# Patient Record
Sex: FEMALE | Race: WHITE | NOT HISPANIC OR LATINO | Employment: FULL TIME | URBAN - METROPOLITAN AREA
[De-identification: names, ages, dates, MRNs, and addresses within clinical notes are randomized per-mention and may not be internally consistent; named-entity substitution may affect disease eponyms.]

---

## 2017-01-18 DIAGNOSIS — F41.9 ANXIETY: Primary | ICD-10-CM

## 2017-01-18 RX ORDER — SERTRALINE HYDROCHLORIDE 100 MG/1
TABLET, FILM COATED ORAL
Qty: 30 TABLET | Refills: 2 | Status: SHIPPED | OUTPATIENT
Start: 2017-01-18 | End: 2017-05-14

## 2017-01-18 NOTE — TELEPHONE ENCOUNTER
sertraline (ZOLOFT) 100 MG tablet     Last Written Prescription Date: 09/06/16  Last Fill Quantity: 30, # refills: 3  Last Office Visit with [facility] primary care provider:  09/06/16        Last PHQ-9 score on record=   PHQ-9 SCORE 9/6/2016   Total Score -   Total Score 10         [facility]

## 2017-01-18 NOTE — TELEPHONE ENCOUNTER
Routing refill request to provider for review/approval because:  PHQ-9 over 4  Tawanna Taveras RN

## 2017-02-09 ENCOUNTER — TELEPHONE (OUTPATIENT)
Dept: FAMILY MEDICINE | Facility: CLINIC | Age: 47
End: 2017-02-09

## 2017-02-09 NOTE — LETTER
February 21, 2017      Jacquie Goncalves  [address]  [place] [facility] [address]        Dear Jacquie,    This is a friendly reminder that you are due for depression follow up with a simple, quick depression questionnaire called a PHQ-9. Please contact our clinic to complete the questionnaire over the phone.    This questionnaire has two main purposes: 1)  It helps your healthcare provider to determine your response to the care we have provided you and helps us guide further management of your mental well being.  2)  Reassure your healthcare provider that your symptoms are stable, if you are no longer experiencing depression symptoms.    If you have any questions, concerns or have trouble completing this questionnaire, please reach out to us through [employer] or by calling our clinic at [phone].    Sincerely,    [facility]  Denae Rush MD, MPH /ymv

## 2017-02-21 NOTE — TELEPHONE ENCOUNTER
Letter created and will be sent to patient's home for patient to contact our clinic to complete questionnaire over the phone.  Jero Victor CMA

## 2017-05-14 DIAGNOSIS — F41.9 ANXIETY: ICD-10-CM

## 2017-05-14 NOTE — TELEPHONE ENCOUNTER
sertraline (ZOLOFT) 100 MG tablet     Last Written Prescription Date: 1/18/17  Last Fill Quantity: 30, # refills: 2  Last Office Visit with [facility] primary care provider:  9/6/16        Last PHQ-9 score on record=   PHQ-9 SCORE 9/6/2016   Total Score -   Total Score 10               Jalil Faarax  [facility]

## 2017-05-15 RX ORDER — SERTRALINE HYDROCHLORIDE 100 MG/1
TABLET, FILM COATED ORAL
Qty: 30 TABLET | Refills: 1 | Status: SHIPPED | OUTPATIENT
Start: 2017-05-15 | End: 2017-07-17

## 2017-05-31 DIAGNOSIS — K00.9 CONGENITAL ANOMALY OF TEETH: ICD-10-CM

## 2017-05-31 NOTE — TELEPHONE ENCOUNTER
PREVIDENT 5000 SENSITIVE 1.1-5 % PSTE      Last Written Prescription Date: 04/06/16  Last Fill Quantity: 100g,  # refills: 12   Last Office Visit with [facility] or [facility] prescribing provider: 09/06/16      [facility]

## 2017-06-05 NOTE — TELEPHONE ENCOUNTER
Routing refill request to provider for review/approval because:  Drug not on the [facility] refill protocol     Britney Manriquez RN, [facility]

## 2017-07-15 DIAGNOSIS — F41.9 ANXIETY: ICD-10-CM

## 2017-07-15 RX ORDER — SERTRALINE HYDROCHLORIDE 100 MG/1
TABLET, FILM COATED ORAL
Qty: 30 TABLET | Refills: 0 | Status: CANCELLED | OUTPATIENT
Start: 2017-07-15

## 2017-07-15 NOTE — TELEPHONE ENCOUNTER
sertraline (ZOLOFT) 100 MG tablet      Last Written Prescription Date: 5/15/17  Last Fill Quantity: 30,  # refills: 1   Last Office Visit with [facility], [facility] or [facility] prescribing provider: 12/15/16

## 2017-07-17 DIAGNOSIS — G47.00 INSOMNIA, UNSPECIFIED TYPE: ICD-10-CM

## 2017-07-17 DIAGNOSIS — F41.9 ANXIETY: Primary | ICD-10-CM

## 2017-07-17 RX ORDER — SERTRALINE HYDROCHLORIDE 100 MG/1
100 TABLET, FILM COATED ORAL DAILY
Qty: 90 TABLET | Refills: 0 | Status: SHIPPED | OUTPATIENT
Start: 2017-07-17 | End: 2017-10-11

## 2017-07-17 RX ORDER — TEMAZEPAM 15 MG/1
15 CAPSULE ORAL
Qty: 30 CAPSULE | Refills: 2 | Status: SHIPPED | OUTPATIENT
Start: 2017-07-17 | End: 2017-11-07

## 2017-10-11 DIAGNOSIS — F41.9 ANXIETY: ICD-10-CM

## 2017-10-12 NOTE — TELEPHONE ENCOUNTER
This writer attempted to contact Jacquie on 10/12/17      Reason for call needs appointment and update PHQ9,  and left detailed message.      If patient calls back:   Patient contacted by clinic RN team. Inform patient that someone from the team will contact them, document that pt called and route to care team. .        Tawanna Morin, RN

## 2017-10-12 NOTE — TELEPHONE ENCOUNTER
MA- please update PHQ9 and schedule for office visit and then flag RN to address refill.  Tawanna Morin RN.

## 2017-10-13 RX ORDER — SERTRALINE HYDROCHLORIDE 100 MG/1
TABLET, FILM COATED ORAL
Qty: 30 TABLET | Refills: 0 | Status: SHIPPED | OUTPATIENT
Start: 2017-10-13 | End: 2017-11-07

## 2017-10-13 ASSESSMENT — PATIENT HEALTH QUESTIONNAIRE - PHQ9: SUM OF ALL RESPONSES TO PHQ QUESTIONS 1-9: 3

## 2017-10-13 NOTE — TELEPHONE ENCOUNTER
Rx for anxiety. PHQ-9 not needed.   Patient due for OV.   Medication is being filled for 1 time refill only due to:  Patient needs to be seen because needs office visit for future refills.   Anne Loja RN

## 2017-10-13 NOTE — TELEPHONE ENCOUNTER
Called pt and scheduled her for an office visit. She did the PHQ-9 and she scored 3. Routing to RN to discuss the refill.Manny Robertson MA

## 2017-11-07 ENCOUNTER — OFFICE VISIT (OUTPATIENT)
Dept: FAMILY MEDICINE | Facility: CLINIC | Age: 47
End: 2017-11-07
Payer: COMMERCIAL

## 2017-11-07 VITALS
WEIGHT: 142.7 LBS | BODY MASS INDEX: 22.93 KG/M2 | OXYGEN SATURATION: 97 % | HEIGHT: 66 IN | RESPIRATION RATE: 16 BRPM | SYSTOLIC BLOOD PRESSURE: 116 MMHG | DIASTOLIC BLOOD PRESSURE: 68 MMHG | HEART RATE: 80 BPM | TEMPERATURE: 98.9 F

## 2017-11-07 DIAGNOSIS — G47.00 INSOMNIA, UNSPECIFIED TYPE: ICD-10-CM

## 2017-11-07 DIAGNOSIS — F41.9 ANXIETY: ICD-10-CM

## 2017-11-07 DIAGNOSIS — F32.1 MODERATE MAJOR DEPRESSION (H): Primary | ICD-10-CM

## 2017-11-07 PROCEDURE — 99214 OFFICE O/P EST MOD 30 MIN: CPT | Performed by: FAMILY MEDICINE

## 2017-11-07 RX ORDER — TEMAZEPAM 15 MG/1
15 CAPSULE ORAL
Qty: 90 CAPSULE | Refills: 1 | Status: SHIPPED | OUTPATIENT
Start: 2017-11-07 | End: 2018-05-08

## 2017-11-07 RX ORDER — SERTRALINE HYDROCHLORIDE 100 MG/1
200 TABLET, FILM COATED ORAL DAILY
Qty: 180 TABLET | Refills: 1 | Status: SHIPPED | OUTPATIENT
Start: 2017-11-07 | End: 2018-06-18

## 2017-11-07 ASSESSMENT — ANXIETY QUESTIONNAIRES
GAD7 TOTAL SCORE: 12
2. NOT BEING ABLE TO STOP OR CONTROL WORRYING: MORE THAN HALF THE DAYS
IF YOU CHECKED OFF ANY PROBLEMS ON THIS QUESTIONNAIRE, HOW DIFFICULT HAVE THESE PROBLEMS MADE IT FOR YOU TO DO YOUR WORK, TAKE CARE OF THINGS AT HOME, OR GET ALONG WITH OTHER PEOPLE: SOMEWHAT DIFFICULT
3. WORRYING TOO MUCH ABOUT DIFFERENT THINGS: MORE THAN HALF THE DAYS
5. BEING SO RESTLESS THAT IT IS HARD TO SIT STILL: SEVERAL DAYS
6. BECOMING EASILY ANNOYED OR IRRITABLE: SEVERAL DAYS
1. FEELING NERVOUS, ANXIOUS, OR ON EDGE: MORE THAN HALF THE DAYS
7. FEELING AFRAID AS IF SOMETHING AWFUL MIGHT HAPPEN: MORE THAN HALF THE DAYS

## 2017-11-07 ASSESSMENT — PATIENT HEALTH QUESTIONNAIRE - PHQ9
5. POOR APPETITE OR OVEREATING: MORE THAN HALF THE DAYS
SUM OF ALL RESPONSES TO PHQ QUESTIONS 1-9: 6

## 2017-11-07 ASSESSMENT — PAIN SCALES - GENERAL: PAINLEVEL: NO PAIN (0)

## 2017-11-07 NOTE — MR AVS SNAPSHOT
"              After Visit Summary   2017    Jacquie Goncalves    MRN: [id number]           Patient Information     Date Of Birth          1970        Visit Information        Provider Department      2017 1:40 PM Sylwia Cutler MD [facility]        Today's Diagnoses     Moderate major depression (H)    -  1    Anxiety        Insomnia, unspecified type           Follow-ups after your visit        Follow-up notes from your care team     Return in about 6 months (around 2018).      Who to contact     If you have questions or need follow up information about today's clinic visit or your schedule please contact [facility] directly at [phone].  Normal or non-critical lab and imaging results will be communicated to you by MyChart, letter or phone within 4 business days after the clinic has received the results. If you do not hear from us within 7 days, please contact the clinic through MyChart or phone. If you have a critical or abnormal lab result, we will notify you by phone as soon as possible.  Submit refill requests through [employer] or call your pharmacy and they will forward the refill request to us. Please allow 3 business days for your refill to be completed.          Additional Information About Your Visit        MyChart Information     [employer] lets you send messages to your doctor, view your test results, renew your prescriptions, schedule appointments and more. To sign up, go to [email or web address] . Click on \"Log in\" on the left side of the screen, which will take you to the Welcome page. Then click on \"Sign up Now\" on the right side of the page.     You will be asked to enter the access code listed below, as well as some personal information. Please follow the directions to create your username and password.     Your access code is: [email or web address]  Expires: 2018  2:55 PM     Your access code will  in 90 days. If you need help or a new " "code, please call your [place] clinic or [phone].        Care EveryWhere ID     This is your Care EveryWhere ID. This could be used by other organizations to access your [place] medical records  [id number]        Your Vitals Were     Pulse Temperature Respirations Height Pulse Oximetry BMI (Body Mass Index)    80 98.9  F (37.2  C) (Oral) 16 1.683 m (5' 6.25\") 97% 22.86 kg/m2       Blood Pressure from Last 3 Encounters:   11/07/17 116/68   12/15/16 133/73   09/06/16 121/72    Weight from Last 3 Encounters:   11/07/17 64.7 kg (142 lb 11.2 oz)   12/15/16 71.7 kg (158 lb)   09/06/16 68.9 kg (152 lb)              Today, you had the following     No orders found for display         Today's Medication Changes          These changes are accurate as of: 11/7/17  2:55 PM.  If you have any questions, ask your nurse or doctor.               These medicines have changed or have updated prescriptions.        Dose/Directions    sertraline 100 MG tablet   Commonly known as:  ZOLOFT   This may have changed:  See the new instructions.   Used for:  Anxiety, Moderate major depression (H)   Changed by:  Sylwia Cutler MD        Dose:  200 mg   Take 2 tablets (200 mg) by mouth daily   Quantity:  180 tablet   Refills:  1            Where to get your medicines      These medications were sent to [id number] [place], [place] - [address]  [address] [address]     Phone:  [phone]     sertraline 100 MG tablet         Some of these will need a paper prescription and others can be bought over the counter.  Ask your nurse if you have questions.     Bring a paper prescription for each of these medications     temazepam 15 MG capsule                Primary Care Provider Office Phone # Fax #    Denae Rush [phone]       [address]  [facility] [address]        Equal Access to Services     GRICEL JERONIMO AH: Hadii germaine Shields, " suri castellaons jlabel chen. So [facility] [phone].    ATENCIÓN: Si karla watson, tiene a correia disposición servicios gratuitos de asistencia lingüística. Sussy al [phone].    We comply with applicable federal civil rights laws and [place] laws. We do not discriminate on the basis of race, color, national origin, age, disability, sex, sexual orientation, or gender identity.            Thank you!     Thank you for choosing [facility]  for your care. Our goal is always to provide you with excellent care. Hearing back from our patients is one way we can continue to improve our services. Please take a few minutes to complete the written survey that you may receive in the mail after your visit with us. Thank you!             Your Updated Medication List - Protect others around you: Learn how to safely use, store and throw away your medicines at www.disposemymeds.org.          This list is accurate as of: 11/7/17  2:55 PM.  Always use your most recent med list.                   Brand Name Dispense Instructions for use Diagnosis    B COMPLEX 1 PO      Take  by mouth.        esomeprazole 40 MG CR capsule    nexIUM    30 capsule    Take 1 capsule (40 mg) by mouth every morning (before breakfast) Take 30-60 minutes before a eating.    Gastroesophageal reflux disease, esophagitis presence not specified       methylphenidate 20 MG CR tablet    METADATE ER    60 tablet    Take 1 tablet (20 mg) by mouth 2 times daily    ADD (attention deficit disorder)       omeprazole 20 MG tablet     90 tablet    Take 1 tablet (20 mg) by mouth daily Take 30-60 minutes before a meal.    GERD (gastroesophageal reflux disease)       PROAIR  (90 BASE) MCG/ACT Inhaler   Generic drug:  albuterol     2    2 puffs twice daily as needed    SOB (shortness of breath)       PROBIOTIC FORMULA PO      Take 1 tablet by mouth daily.        RESTASIS 0.05 % ophthalmic emulsion   Generic drug:   cycloSPORINE      1 drop 2 times daily.        sertraline 100 MG tablet    ZOLOFT    180 tablet    Take 2 tablets (200 mg) by mouth daily    Anxiety, Moderate major depression (H)       Sod Fluoride-Potassium Nitrate 1.1-5 % Pste    PREVIDENT 5000 SENSITIVE    100 g    USE DAILY    Congenital anomaly of teeth       temazepam 15 MG capsule    RESTORIL    90 capsule    Take 1 capsule (15 mg) by mouth nightly as needed for sleep    Insomnia, unspecified type       traMADol 50 MG tablet    ULTRAM     Take  by mouth. Start with 1 tab at supper for 1 week, then increase to 1 twice daily for 1 week, then 1 three times daily for 1 week, then 1 four times daily        vitamin D 2000 UNITS Caps      Take 2,000 Units by mouth daily.    Vitamin D deficiency

## 2017-11-07 NOTE — PROGRESS NOTES
"  SUBJECTIVE:   Jacquie Goncalves is a 47 year old female who presents to clinic today for the following health issues:      Depression and Anxiety Follow-Up    Status since last visit: stable    Other associated symptoms:None    Complicating factors:     Significant life event: 's health    Current substance abuse: None    PHQ-9 Score and MyChart F/U Questions 9/6/2016 10/13/2017   Total Score 10 3   Q9: Suicide Ideation Not at all Not at all     SLOAN-7 SCORE 1/23/2015 7/3/2015 9/6/2016   Total Score 5 0 -   Total Score - - 8       PHQ-9  English  PHQ-9   Any Language  GAD7  Suicide Assessment Five-step Evaluation and Treatment (SAFE-T)      Amount of exercise or physical activity: active    Problems taking medications regularly: No -- has been taking 150 MG daily of sertraline (pt states hasn't seen a large difference with this)    Medication side effects: none    Diet: regular (no restrictions)    SUBJECTIVE:  Here today in follow-up depression, anxiety, insomnia. Patient well-known to me as I see her  and son for number of years.  unfortunately has recently been enrolled in hospice for metastatic pancreatic cancer. The patient feels that the Zoloft is helping but has increased her dose 250 mg daily and wonders about going up a little bit more. No particular side effects.    Review of systems otherwise negative.  Past medical, family, and social history reviewed and updated in chart.    OBJECTIVE:  /68 (BP Location: Right arm, Patient Position: Right side, Cuff Size: Adult Regular)  Pulse 80  Temp 98.9  F (37.2  C) (Oral)  Resp 16  Ht 1.683 m (5' 6.25\")  Wt 64.7 kg (142 lb 11.2 oz)  SpO2 97%  BMI 22.86 kg/m2  Alert, pleasant, upbeat, and in no apparent discomfort.  S1 and S2 normal, no murmurs, clicks, gallops or rubs. Regular rate and rhythm. Chest is clear; no wheezes or rales. No edema or JVD.  Past labs reviewed with the patient.     ASSESSMENT / PLAN:  (F32.1) Moderate major " depression (H)  (primary encounter diagnosis)  Comment: No increased the dosage of 200 mg daily and follow-up in a month or so - Joyce okay  Plan: sertraline (ZOLOFT) 100 MG tablet            (F41.9) Anxiety  Comment: Stable. As above  Plan: sertraline (ZOLOFT) 100 MG tablet            (G47.00) Insomnia, unspecified type  Comment: refilled - doing well on current dosage  Plan: temazepam (RESTORIL) 15 MG capsule            Follow up 6 months - would like an update in 1 month on her progress  SMike Cutler MD    (Chart documentation completed in part with Dragon voice-recognition software.  Even though reviewed some grammatical, spelling, and word errors may remain.)

## 2017-11-07 NOTE — NURSING NOTE
"Chief Complaint   Patient presents with     Recheck Medication       Initial /68 (BP Location: Right arm, Patient Position: Right side, Cuff Size: Adult Regular)  Pulse 80  Temp 98.9  F (37.2  C) (Oral)  Resp 16  Ht 1.683 m (5' 6.25\")  Wt 64.7 kg (142 lb 11.2 oz)  SpO2 97%  BMI 22.86 kg/m2 Estimated body mass index is 22.86 kg/(m^2) as calculated from the following:    Height as of this encounter: 1.683 m (5' 6.25\").    Weight as of this encounter: 64.7 kg (142 lb 11.2 oz).  Medication Reconciliation: completecomplete  Chapo Hale MA      "

## 2017-11-08 ASSESSMENT — ANXIETY QUESTIONNAIRES: GAD7 TOTAL SCORE: 12

## 2018-01-26 ENCOUNTER — TELEPHONE (OUTPATIENT)
Dept: FAMILY MEDICINE | Facility: CLINIC | Age: 48
End: 2018-01-26

## 2018-01-26 NOTE — TELEPHONE ENCOUNTER
PHARMACY MESSAGE: QTY LIMIT EXCD PA RQD CALL [phone].  DAYS QUANTITY OF 45.  QTY LIMIT EXCEPTION PA NEEDED.    ID: [id number]  BIN: [id number]   PLAN NAME: BEN CHACON

## 2018-01-29 NOTE — TELEPHONE ENCOUNTER
PA Initiation    Medication: temazepam (RESTORIL) 15 MG capsule  Insurance Company: [employer] - Phone [phone] Fax [phone]  Pharmacy Filling the Rx: [facility][id number] - [place], [place] - [address]  Filling Pharmacy Phone: [phone]  Filling Pharmacy Fax: [phone]  Start Date: 1/29/2018

## 2018-01-30 NOTE — TELEPHONE ENCOUNTER
PRIOR AUTHORIZATION DENIED    Medication: temazepam (RESTORIL) 15 MG capsule- DENIED    Denial Date: 1/29/2018    Denial Rational: Denied due to request being over their quantity limit of 15 capsules per month:             Appeal Information:

## 2018-01-30 NOTE — TELEPHONE ENCOUNTER
Hello, the PA has been denied, please see rational. If an appeal is desired, please submit a written letter of medical necessity to our PA Team and we can initiate the appeal. Please close encounter if finished. Thank you, Virgie   (Routing comment)       Breanna Morton MA

## 2018-05-08 DIAGNOSIS — G47.00 INSOMNIA, UNSPECIFIED TYPE: ICD-10-CM

## 2018-05-08 NOTE — TELEPHONE ENCOUNTER
Requested Prescriptions   Pending Prescriptions Disp Refills     temazepam (RESTORIL) 15 MG capsule [Pharmacy Med Name: Temazepam Oral Capsule 15 MG]      Last Written Prescription Date:  11/7/17  Last Fill Quantity: 90 capsule,   # refills: 2  Last Office Visit: 11/07/17 Dr. Cutler  Future Office visit:       Routing refill request to provider for review/approval because:  Drug not on the [facility], P or [facility] refill protocol or controlled substance 30 capsule 0     Sig: TAKE ONE CAPSULE BY MOUTH AT BEDTIME AS NEEDED FOR SLEEP    There is no refill protocol information for this order

## 2018-05-09 RX ORDER — TEMAZEPAM 15 MG/1
CAPSULE ORAL
Qty: 30 CAPSULE | Refills: 0 | Status: SHIPPED | OUTPATIENT
Start: 2018-05-09 | End: 2018-06-18

## 2018-06-18 ENCOUNTER — OFFICE VISIT (OUTPATIENT)
Dept: FAMILY MEDICINE | Facility: CLINIC | Age: 48
End: 2018-06-18
Payer: COMMERCIAL

## 2018-06-18 VITALS
RESPIRATION RATE: 16 BRPM | OXYGEN SATURATION: 99 % | WEIGHT: 154.7 LBS | BODY MASS INDEX: 24.78 KG/M2 | DIASTOLIC BLOOD PRESSURE: 72 MMHG | TEMPERATURE: 98.3 F | SYSTOLIC BLOOD PRESSURE: 116 MMHG | HEART RATE: 83 BPM

## 2018-06-18 DIAGNOSIS — G47.00 INSOMNIA, UNSPECIFIED TYPE: ICD-10-CM

## 2018-06-18 DIAGNOSIS — F98.8 ATTENTION DEFICIT DISORDER (ADD) WITHOUT HYPERACTIVITY: Primary | ICD-10-CM

## 2018-06-18 DIAGNOSIS — F41.9 ANXIETY: ICD-10-CM

## 2018-06-18 DIAGNOSIS — F32.1 MODERATE MAJOR DEPRESSION (H): ICD-10-CM

## 2018-06-18 DIAGNOSIS — Z12.31 VISIT FOR SCREENING MAMMOGRAM: ICD-10-CM

## 2018-06-18 PROCEDURE — 99214 OFFICE O/P EST MOD 30 MIN: CPT | Performed by: FAMILY MEDICINE

## 2018-06-18 RX ORDER — TEMAZEPAM 15 MG/1
CAPSULE ORAL
Qty: 30 CAPSULE | Refills: 2 | Status: SHIPPED | OUTPATIENT
Start: 2018-06-18 | End: 2018-09-24

## 2018-06-18 RX ORDER — METHYLPHENIDATE HYDROCHLORIDE EXTENDED RELEASE 20 MG/1
20 TABLET ORAL 2 TIMES DAILY
Qty: 60 TABLET | Refills: 0 | Status: SHIPPED | OUTPATIENT
Start: 2018-06-18 | End: 2018-06-18

## 2018-06-18 RX ORDER — SERTRALINE HYDROCHLORIDE 100 MG/1
100 TABLET, FILM COATED ORAL DAILY
Qty: 90 TABLET | Refills: 1 | Status: SHIPPED | OUTPATIENT
Start: 2018-06-18 | End: 2019-04-04

## 2018-06-18 RX ORDER — METHYLPHENIDATE HYDROCHLORIDE EXTENDED RELEASE 20 MG/1
20 TABLET ORAL 2 TIMES DAILY
Qty: 60 TABLET | Refills: 0 | Status: SHIPPED | OUTPATIENT
Start: 2018-08-18 | End: 2018-07-25

## 2018-06-18 RX ORDER — METHYLPHENIDATE HYDROCHLORIDE EXTENDED RELEASE 20 MG/1
20 TABLET ORAL 2 TIMES DAILY
Qty: 60 TABLET | Refills: 0 | Status: SHIPPED | OUTPATIENT
Start: 2018-07-18 | End: 2018-06-18

## 2018-06-18 ASSESSMENT — PAIN SCALES - GENERAL: PAINLEVEL: NO PAIN (0)

## 2018-06-18 NOTE — PROGRESS NOTES
SUBJECTIVE:   Jacquie Goncalves is a 48 year old female who presents to clinic today for the following health issues:    Depression and Anxiety Follow-Up    Status since last visit: Improved     Other associated symptoms: Death in the Family (Spouse)    Complicating factors:     Significant life event: Yes- Death in the family      Current substance abuse: None    PHQ-9 9/6/2016 10/13/2017 11/7/2017   Total Score 10 3 6   Q9: Suicide Ideation Not at all Not at all Not at all     SLOAN-7 SCORE 7/3/2015 9/6/2016 11/7/2017   Total Score 0 - -   Total Score - 8 12       PHQ-9  English  PHQ-9   Any Language  SLOAN-7  Suicide Assessment Five-step Evaluation and Treatment (SAFE-T)      Amount of exercise or physical activity: 2-3 days/week for an average of 45-60 minutes    Problems taking medications regularly: No    Medication side effects: none    Diet: regular (no restrictions)    Medication Followup of Methylphenidate ER 200mg    Taking Medication as prescribed: NO-Attention Deficit Disorder    Side Effects:  None    Medication Helped Symptoms:  yes     Red Spot      Duration: months    Description (location/character/radiation): Left arm    Intensity:  mild    Accompanying signs and symptoms: itches     History (similar episodes/previous evaluation): None    Precipitating or alleviating factors: None    Therapies tried and outcome: None     SUBJECTIVE:  Here today to follow-up on anxiety and depression and to discuss ADHD.  Long-standing diagnosis and she was on methylphenidate for many years.  Left her job a few years back and has not needed the medication but is starting back again soon and thinks it is time to get back on.  About 6 months since her 's death and she actually think she is doing fairly well.  Has read a lot of books about the grieving states and has a lot of good family support.  She is back down to 100 milligrams of Zoloft and occasional Restoril at bedtime.  In general things are  stable.    Review of systems otherwise negative.  Past medical, family, and social history reviewed and updated in chart.    OBJECTIVE:  /72 (BP Location: Left arm, Patient Position: Chair, Cuff Size: Adult Large)  Pulse 83  Temp 98.3  F (36.8  C) (Oral)  Resp 16  Wt 70.2 kg (154 lb 11.2 oz)  SpO2 99%  BMI 24.78 kg/m2  Psych: Alert and oriented times 3; coherent speech, normal   rate and volume, able to articulate logical thoughts, able   to abstract reason, no tangential thoughts, no hallucinations   or delusions  Her affect is upbeat and appropriate  S1 and S2 normal, no murmurs, clicks, gallops or rubs. Regular rate and rhythm. Chest is clear; no wheezes or rales. No edema or JVD.  Past labs reviewed with the patient.     ASSESSMENT / PLAN:  (F98.8) Attention deficit disorder (ADD) without hyperactivity  (primary encounter diagnosis)  Comment: Discussed restarting it once daily for jumping into twice daily  Plan: methylphenidate (METADATE ER) 20 MG CR tablet,         DISCONTINUED: methylphenidate (METADATE ER) 20         MG CR tablet, DISCONTINUED: methylphenidate         (METADATE ER) 20 MG CR tablet            (F32.1) Moderate major depression (H)  Comment: Doing well on 100 mg daily and will continue the same  Plan: sertraline (ZOLOFT) 100 MG tablet            (F41.9) Anxiety  Comment: As above  Plan: sertraline (ZOLOFT) 100 MG tablet            (G47.00) Insomnia, unspecified type  Comment: Stable.  Refilled.  Plan: temazepam (RESTORIL) 15 MG capsule            (Z12.31) Visit for screening mammogram  Comment:   Plan: MA SCREENING DIGITAL BILAT - Future  (s+30)            Follow up 6 months.  Can call for refills  S. Jesus Cutler MD    (Chart documentation completed in part with Dragon voice-recognition software.  Even though reviewed some grammatical, spelling, and word errors may remain.)

## 2018-06-18 NOTE — MR AVS SNAPSHOT
After Visit Summary   6/18/2018    Jacquie Goncalves    MRN: [id number]           Patient Information     Date Of Birth          1970        Visit Information        Provider Department      6/18/2018 4:00 PM Sylwia Cutler MD [facility]        Today's Diagnoses     Attention deficit disorder (ADD) without hyperactivity    -  1    Moderate major depression (H)        Anxiety        Insomnia, unspecified type        Visit for screening mammogram           Follow-ups after your visit        Follow-up notes from your care team     Return in about 6 months (around 12/18/2018).      Future tests that were ordered for you today     Open Future Orders        Priority Expected Expires Ordered    MA SCREENING DIGITAL BILAT - Future  (s+30) Routine  6/18/2019 6/18/2018            Who to contact     If you have questions or need follow up information about today's clinic visit or your schedule please contact [facility] directly at [phone].  Normal or non-critical lab and imaging results will be communicated to you by MyChart, letter or phone within 4 business days after the clinic has received the results. If you do not hear from us within 7 days, please contact the clinic through MyChart or phone. If you have a critical or abnormal lab result, we will notify you by phone as soon as possible.  Submit refill requests through [employer] or call your pharmacy and they will forward the refill request to us. Please allow 3 business days for your refill to be completed.          Additional Information About Your Visit        Care EveryWhere ID     This is your Care EveryWhere ID. This could be used by other organizations to access your [facility] medical records  [id number]        Your Vitals Were     Pulse Temperature Respirations Pulse Oximetry BMI (Body Mass Index)       83 98.3  F (36.8  C) (Oral) 16 99% 24.78 kg/m2        Blood Pressure from Last 3 Encounters:    06/18/18 116/72   11/07/17 116/68   12/15/16 133/73    Weight from Last 3 Encounters:   06/18/18 70.2 kg (154 lb 11.2 oz)   11/07/17 64.7 kg (142 lb 11.2 oz)   12/15/16 71.7 kg (158 lb)                 Today's Medication Changes          These changes are accurate as of 6/18/18  4:29 PM.  If you have any questions, ask your nurse or doctor.               Start taking these medicines.        Dose/Directions    methylphenidate 20 MG CR tablet   Commonly known as:  METADATE ER   Used for:  Attention deficit disorder (ADD) without hyperactivity   Started by:  Sylwia Cutler MD        Dose:  20 mg   Start taking on:  8/18/2018   Take 1 tablet (20 mg) by mouth 2 times daily   Quantity:  60 tablet   Refills:  0         These medicines have changed or have updated prescriptions.        Dose/Directions    sertraline 100 MG tablet   Commonly known as:  ZOLOFT   This may have changed:  how much to take   Used for:  Anxiety, Moderate major depression (H)   Changed by:  Sylwia Cutler MD        Dose:  100 mg   Take 1 tablet (100 mg) by mouth daily   Quantity:  90 tablet   Refills:  1            Where to get your medicines      These medications were sent to [id number] - [place], [place] - [address] [address]     Phone:  [phone]     sertraline 100 MG tablet         Some of these will need a paper prescription and others can be bought over the counter.  Ask your nurse if you have questions.     Bring a paper prescription for each of these medications     methylphenidate 20 MG CR tablet    temazepam 15 MG capsule                Primary Care Provider Office Phone # Fax #    Denae Rush [phone]       [address]  [facility] [address]        Equal Access to Services     GRICEL JERONIMO AH: Sudhakar Freed, waalmada luqadaha, qaybta kaalmada radha, abel dunne. [facility]  [phone].    ATENCIÓN: Si karla watson, tiene a correia disposición servicios gratuitos de asistencia lingüística. Sussy alcantara [phone].    We comply with applicable federal civil rights laws and [place] laws. We do not discriminate on the basis of race, color, national origin, age, disability, sex, sexual orientation, or gender identity.            Thank you!     Thank you for choosing [facility]  for your care. Our goal is always to provide you with excellent care. Hearing back from our patients is one way we can continue to improve our services. Please take a few minutes to complete the written survey that you may receive in the mail after your visit with us. Thank you!             Your Updated Medication List - Protect others around you: Learn how to safely use, store and throw away your medicines at www.disposemymeds.org.          This list is accurate as of 6/18/18  4:29 PM.  Always use your most recent med list.                   Brand Name Dispense Instructions for use Diagnosis    B COMPLEX 1 PO      Take  by mouth.        esomeprazole 40 MG CR capsule    nexIUM    30 capsule    Take 1 capsule (40 mg) by mouth every morning (before breakfast) Take 30-60 minutes before a eating.    Gastroesophageal reflux disease, esophagitis presence not specified       methylphenidate 20 MG CR tablet   Start taking on:  8/18/2018    METADATE ER    60 tablet    Take 1 tablet (20 mg) by mouth 2 times daily    Attention deficit disorder (ADD) without hyperactivity       omeprazole 20 MG tablet     90 tablet    Take 1 tablet (20 mg) by mouth daily Take 30-60 minutes before a meal.    GERD (gastroesophageal reflux disease)       PROAIR  (90 Base) MCG/ACT Inhaler   Generic drug:  albuterol     2    2 puffs twice daily as needed    SOB (shortness of breath)       PROBIOTIC FORMULA PO      Take 1 tablet by mouth daily.        RESTASIS 0.05 % ophthalmic emulsion   Generic drug:  cycloSPORINE      1 drop 2  times daily.        sertraline 100 MG tablet    ZOLOFT    90 tablet    Take 1 tablet (100 mg) by mouth daily    Anxiety, Moderate major depression (H)       Sod Fluoride-Potassium Nitrate 1.1-5 % Pste    PREVIDENT 5000 SENSITIVE    100 g    USE DAILY    Congenital anomaly of teeth       temazepam 15 MG capsule    RESTORIL    30 capsule    TAKE ONE CAPSULE BY MOUTH AT BEDTIME AS NEEDED FOR SLEEP    Insomnia, unspecified type       traMADol 50 MG tablet    ULTRAM     Take  by mouth. Start with 1 tab at supper for 1 week, then increase to 1 twice daily for 1 week, then 1 three times daily for 1 week, then 1 four times daily        vitamin D 2000 units Caps      Take 2,000 Units by mouth daily.    Vitamin D deficiency

## 2018-06-20 ASSESSMENT — PATIENT HEALTH QUESTIONNAIRE - PHQ9: 5. POOR APPETITE OR OVEREATING: SEVERAL DAYS

## 2018-06-20 ASSESSMENT — ANXIETY QUESTIONNAIRES
7. FEELING AFRAID AS IF SOMETHING AWFUL MIGHT HAPPEN: MORE THAN HALF THE DAYS
GAD7 TOTAL SCORE: 9
IF YOU CHECKED OFF ANY PROBLEMS ON THIS QUESTIONNAIRE, HOW DIFFICULT HAVE THESE PROBLEMS MADE IT FOR YOU TO DO YOUR WORK, TAKE CARE OF THINGS AT HOME, OR GET ALONG WITH OTHER PEOPLE: SOMEWHAT DIFFICULT
6. BECOMING EASILY ANNOYED OR IRRITABLE: SEVERAL DAYS
2. NOT BEING ABLE TO STOP OR CONTROL WORRYING: SEVERAL DAYS
5. BEING SO RESTLESS THAT IT IS HARD TO SIT STILL: SEVERAL DAYS
3. WORRYING TOO MUCH ABOUT DIFFERENT THINGS: MORE THAN HALF THE DAYS
1. FEELING NERVOUS, ANXIOUS, OR ON EDGE: SEVERAL DAYS

## 2018-06-21 DIAGNOSIS — K00.9 CONGENITAL ANOMALY OF TEETH: ICD-10-CM

## 2018-06-21 ASSESSMENT — ASTHMA QUESTIONNAIRES: ACT_TOTALSCORE: 25

## 2018-06-21 ASSESSMENT — PATIENT HEALTH QUESTIONNAIRE - PHQ9: SUM OF ALL RESPONSES TO PHQ QUESTIONS 1-9: 5

## 2018-06-21 ASSESSMENT — ANXIETY QUESTIONNAIRES: GAD7 TOTAL SCORE: 9

## 2018-06-21 NOTE — TELEPHONE ENCOUNTER
Requested Prescriptions   Pending Prescriptions Disp Refills     Sod Fluoride-Potassium Nitrate (PREVIDENT 5000 SENSITIVE) 1.1-5 % PSTE 100 g 12     Sig: USE DAILY    There is no refill protocol information for this order        Sod Fluoride-Potassium Nitrate (PREVIDENT 5000 SENSITIVE) 1.1-5 % PSTE  Last Written Prescription Date:  6/5/17  Last Fill Quantity: 100g,  # refills: 12   Last office visit: 6/18/2018 with prescribing provider:  Dr. Cutler   Future Office Visit:

## 2018-07-24 ENCOUNTER — TELEPHONE (OUTPATIENT)
Dept: FAMILY MEDICINE | Facility: CLINIC | Age: 48
End: 2018-07-24

## 2018-07-24 DIAGNOSIS — F98.8 ATTENTION DEFICIT DISORDER (ADD) WITHOUT HYPERACTIVITY: ICD-10-CM

## 2018-07-24 NOTE — TELEPHONE ENCOUNTER
...Reason for Call:   prescription    Detailed comments: Patient said she want to know if the METHYLPHENIDATE can be changed from extended release to none release. She said she would like to do 10mgs 3 times a day.    Phone Number Patient can be reached at: Home number on file [phone] (home)    Best Time: anytime    Can we leave a detailed message on this number? YES    Call taken on 7/24/2018 at 9:45 AM by Mark Jorgensen

## 2018-07-24 NOTE — TELEPHONE ENCOUNTER
Routing to provider to review and advise. See message below.    Snehal Byran RN  [facility]

## 2018-07-25 RX ORDER — METHYLPHENIDATE HYDROCHLORIDE 10 MG/1
10 TABLET ORAL 3 TIMES DAILY
Qty: 90 TABLET | Refills: 0 | Status: SHIPPED | OUTPATIENT
Start: 2018-07-25 | End: 2019-04-12

## 2018-09-24 DIAGNOSIS — G47.00 INSOMNIA, UNSPECIFIED TYPE: ICD-10-CM

## 2018-09-24 RX ORDER — TEMAZEPAM 15 MG/1
CAPSULE ORAL
Qty: 30 CAPSULE | Refills: 2 | Status: SHIPPED | OUTPATIENT
Start: 2018-09-24 | End: 2018-12-19

## 2018-09-24 NOTE — TELEPHONE ENCOUNTER
Requested Prescriptions   Pending Prescriptions Disp Refills     temazepam (RESTORIL) 15 MG capsule [Pharmacy Med Name: Temazepam Oral Capsule 15 MG] 30 capsule 1     Sig: TAKE ONE CAPSULE BY MOUTH AT BEDTIME AS NEEDED FOR SLEEP    There is no refill protocol information for this order        Routing refill request to provider for review/approval because:  Drug not on the [facility] refill protocol     Chaz Barnett RN, BSN

## 2018-12-19 DIAGNOSIS — G47.00 INSOMNIA, UNSPECIFIED TYPE: ICD-10-CM

## 2018-12-20 NOTE — TELEPHONE ENCOUNTER
temazepam (RESTORIL) 15 MG capsule      Last Written Prescription Date:  9/24/18  Last Fill Quantity: 30,   # refills: 2  Last Office Visit: He  Future Office visit:       Routing refill request to provider for review/approval because:  Drug not on the [facility], [facility] or [facility] refill protocol or controlled substance

## 2018-12-21 RX ORDER — TEMAZEPAM 15 MG/1
CAPSULE ORAL
Qty: 30 CAPSULE | Refills: 1 | Status: SHIPPED | OUTPATIENT
Start: 2018-12-21 | End: 2019-02-10

## 2019-02-10 DIAGNOSIS — G47.00 INSOMNIA, UNSPECIFIED TYPE: ICD-10-CM

## 2019-02-11 RX ORDER — TEMAZEPAM 15 MG/1
CAPSULE ORAL
Qty: 30 CAPSULE | Refills: 0 | Status: SHIPPED | OUTPATIENT
Start: 2019-02-11 | End: 2019-03-17

## 2019-03-17 DIAGNOSIS — G47.00 INSOMNIA, UNSPECIFIED TYPE: ICD-10-CM

## 2019-03-18 RX ORDER — TEMAZEPAM 15 MG/1
CAPSULE ORAL
Qty: 30 CAPSULE | Refills: 0 | Status: SHIPPED | OUTPATIENT
Start: 2019-03-18 | End: 2019-04-12

## 2019-03-18 NOTE — TELEPHONE ENCOUNTER
Requested Prescriptions   Pending Prescriptions Disp Refills     temazepam (RESTORIL) 15 MG capsule [Pharmacy Med Name: Temazepam Oral Capsule 15 MG] 30 capsule 0     Sig: TAKE ONE CAPSULE BY MOUTH AT BEDTIME AS NEEDED FOR SLEEP    There is no refill protocol information for this order        temazepam (RESTORIL) 15 MG capsule  Last Written Prescription Date:  2/11/19  Last Fill Quantity: 30,  # refills: 0   Last office visit: 6/18/2018 with prescribing provider:  Dr. Cutler   Future Office Visit:

## 2019-03-18 NOTE — TELEPHONE ENCOUNTER
Routing refill request to provider for review/approval because:  Drug not on the [facility] refill protocol           Chaz Barnett RN, BSN

## 2019-04-04 DIAGNOSIS — F32.1 MODERATE MAJOR DEPRESSION (H): ICD-10-CM

## 2019-04-04 DIAGNOSIS — F41.9 ANXIETY: ICD-10-CM

## 2019-04-04 RX ORDER — SERTRALINE HYDROCHLORIDE 100 MG/1
100 TABLET, FILM COATED ORAL DAILY
Qty: 30 TABLET | Refills: 0 | Status: SHIPPED | OUTPATIENT
Start: 2019-04-04 | End: 2019-04-12

## 2019-04-04 NOTE — TELEPHONE ENCOUNTER
"Requested Prescriptions   Pending Prescriptions Disp Refills     sertraline (ZOLOFT) 100 MG tablet [Pharmacy Med Name: Sertraline HCl Oral Tablet 100 MG] 30 tablet 0     Sig: Take 1 tablet (100 mg) by mouth daily    SSRIs Protocol Failed - 4/4/2019  7:07 AM       Failed - PHQ-9 score less than 5 in past 6 months    Please review last PHQ-9 score.          Failed - Recent (6 mo) or future (30 days) visit within the authorizing provider's specialty    Patient had office visit in the last 6 months or has a visit in the next 30 days with authorizing provider or within the authorizing provider's specialty.  See \"Patient Info\" tab in inbasket, or \"Choose Columns\" in Meds & Orders section of the refill encounter.           Passed - Medication is active on med list       Passed - Patient is age 18 or older       Passed - No active pregnancy on record       Passed - No positive pregnancy test in last 12 months        sertraline (ZOLOFT) 100 MG tablet  Last Written Prescription Date:  3/18/18  Last Fill Quantity: 90,  # refills: 1   Last office visit: 6/18/2018 with prescribing provider:  Dr. Cutler   Future Office Visit:      PHQ-9 SCORE 10/13/2017 11/7/2017 6/20/2018   PHQ-9 Total Score - - -   PHQ-9 Total Score 3 6 5     SLOAN-7 SCORE 9/6/2016 11/7/2017 6/20/2018   Total Score - - -   Total Score 8 12 9         "

## 2019-04-04 NOTE — TELEPHONE ENCOUNTER
Routing refill request to provider for review/approval because:  Labs not current:  PHQ-9  A break in medication  Patient has not been seen in the last 6 months.     Casi Rios RN

## 2019-04-12 ENCOUNTER — OFFICE VISIT (OUTPATIENT)
Dept: FAMILY MEDICINE | Facility: CLINIC | Age: 49
End: 2019-04-12
Payer: COMMERCIAL

## 2019-04-12 VITALS
WEIGHT: 157 LBS | HEIGHT: 67 IN | HEART RATE: 74 BPM | OXYGEN SATURATION: 97 % | TEMPERATURE: 98.2 F | SYSTOLIC BLOOD PRESSURE: 114 MMHG | DIASTOLIC BLOOD PRESSURE: 74 MMHG | BODY MASS INDEX: 24.64 KG/M2

## 2019-04-12 DIAGNOSIS — F41.9 ANXIETY: ICD-10-CM

## 2019-04-12 DIAGNOSIS — G47.00 INSOMNIA, UNSPECIFIED TYPE: ICD-10-CM

## 2019-04-12 DIAGNOSIS — F32.1 MODERATE MAJOR DEPRESSION (H): Primary | ICD-10-CM

## 2019-04-12 PROCEDURE — 99214 OFFICE O/P EST MOD 30 MIN: CPT | Performed by: FAMILY MEDICINE

## 2019-04-12 RX ORDER — TEMAZEPAM 15 MG/1
CAPSULE ORAL
Qty: 30 CAPSULE | Refills: 5 | Status: SHIPPED | OUTPATIENT
Start: 2019-04-12 | End: 2019-10-16

## 2019-04-12 RX ORDER — LORAZEPAM 0.5 MG/1
0.5 TABLET ORAL 3 TIMES DAILY PRN
Qty: 30 TABLET | Refills: 2 | Status: SHIPPED | OUTPATIENT
Start: 2019-04-12 | End: 2019-11-22

## 2019-04-12 RX ORDER — SERTRALINE HYDROCHLORIDE 100 MG/1
100 TABLET, FILM COATED ORAL DAILY
Qty: 90 TABLET | Refills: 3 | Status: SHIPPED | OUTPATIENT
Start: 2019-04-12 | End: 2020-03-17

## 2019-04-12 ASSESSMENT — MIFFLIN-ST. JEOR: SCORE: 1363.65

## 2019-04-12 NOTE — PROGRESS NOTES
SUBJECTIVE:   Jacquie Goncalves is a 48 year old female who presents to clinic today for the following   health issues:    Patient would like to discuss starting Lorazepam   Depression and Anxiety Follow-Up    Status since last visit: No change    Other associated symptoms:None    Complicating factors:     Significant life event: No     Current substance abuse: None    PHQ 10/13/2017 11/7/2017 6/20/2018   PHQ-9 Total Score 3 6 5   Q9: Thoughts of better off dead/self-harm past 2 weeks Not at all Not at all Not at all     SLOAN-7 SCORE 9/6/2016 11/7/2017 6/20/2018   Total Score - - -   Total Score 8 12 9       PHQ-9  English  PHQ-9   Any Language  SLOAN-7  Suicide Assessment Five-step Evaluation and Treatment (SAFE-T)  Medication Followup of Ritalin     Taking Medication as prescribed: No patient is using rarely 1-2 times per month     Side Effects:  None    Medication Helping Symptoms:  yes       Amount of exercise or physical activity: 2-3 days/week for an average of 15-30 minutes    Problems taking medications regularly: No    Medication side effects: No    Diet: regular (no restrictions)    SUBJECTIVE:  Here today in follow-up of depression, anxiety, and insomnia.  Patient well-known to me and is generally doing well now that time is gone by after the death of her , another patient of mine.  Back to working full-time and generally things are good but some of the staff meetings are a bit anxiety provoking.  Does have a history of ADHD but does not really feel as though focus is an issue for her.  More so she tends to get nervous in certain situations and had used some leftover Lorazepam that her  had had and this really seemed to calm things down which eventually helped her focus.  The Ritalin was quite expensive and she wonders if she could try Lorazepam on an as-needed basis.  Still sleeping very well with the temazepam.    Review of systems otherwise negative.  Past medical, family, and social  "history reviewed and updated in chart.    OBJECTIVE:  /74 (BP Location: Right arm, Patient Position: Chair, Cuff Size: Adult Regular)   Pulse 74   Temp 98.2  F (36.8  C) (Oral)   Ht 1.7 m (5' 6.93\")   Wt 71.2 kg (157 lb)   SpO2 97%   Breastfeeding? No   BMI 24.64 kg/m    Psych: Alert and oriented times 3; coherent speech, normal   rate and volume, able to articulate logical thoughts, able   to abstract reason, no tangential thoughts, no hallucinations   or delusions  Her affect is upbeat and appropriate  S1 and S2 normal, no murmurs, clicks, gallops or rubs. Regular rate and rhythm. Chest is clear; no wheezes or rales. No edema or JVD.  Past labs reviewed with the patient.     ASSESSMENT / PLAN:  (F32.1) Moderate major depression (H)  (primary encounter diagnosis)  Comment: Doing well on her current dosage of sertraline and will continue the same  Plan: sertraline (ZOLOFT) 100 MG tablet            (F41.9) Anxiety  Comment: Okay for intermittent use of lorazepam  Plan: sertraline (ZOLOFT) 100 MG tablet, LORazepam         (ATIVAN) 0.5 MG tablet        Discussed mechanism of action of the proposed medication, as well as potential effects, both good and bad.  Patient expressed understanding and agreed with treatment.     (G47.00) Insomnia, unspecified type  Comment: Doing well on current dosage.  Continue same  Plan: temazepam (RESTORIL) 15 MG capsule            Follow up annually  ESPERANZA Cutler MD    (Chart documentation completed in part with Dragon voice-recognition software.  Even though reviewed some grammatical, spelling, and word errors may remain.)       "

## 2019-04-18 DIAGNOSIS — G47.00 INSOMNIA, UNSPECIFIED TYPE: ICD-10-CM

## 2019-04-19 RX ORDER — TEMAZEPAM 15 MG/1
CAPSULE ORAL
Qty: 30 CAPSULE | Refills: 0 | OUTPATIENT
Start: 2019-04-19

## 2019-04-19 NOTE — TELEPHONE ENCOUNTER
Requested Prescriptions   Pending Prescriptions Disp Refills     temazepam (RESTORIL) 15 MG capsule [Pharmacy Med Name: Temazepam Oral Capsule 15 MG] 30 capsule 0     Sig: TAKE ONE CAPSULE BY MOUTH NIGHTLY AT BEDTIME AS NEEDED FOR SLEEP       There is no refill protocol information for this order        Routing refill request to provider for review/approval because:  Drug not on the [facility] refill protocol.    Britney Manriquez RN, [facility]

## 2019-04-20 DIAGNOSIS — G47.00 INSOMNIA, UNSPECIFIED TYPE: ICD-10-CM

## 2019-04-22 RX ORDER — TEMAZEPAM 15 MG/1
CAPSULE ORAL
Qty: 30 CAPSULE | Refills: 0 | OUTPATIENT
Start: 2019-04-22

## 2019-04-22 NOTE — TELEPHONE ENCOUNTER
..Reason for Call:   pharmacist calling/Cub    Detailed comments: pharmacist needs verbal auth for the temazepam;     Phone Number Patient can be reached at:  phone number:  [phone]    Best Time: anytime    Can we leave a detailed message on this number?  N/a      Call taken on 4/22/2019 at 9:30 AM by Melani Farr

## 2019-04-22 NOTE — TELEPHONE ENCOUNTER
Refused noting to see the 4/12/19 faxed Rx for this medication.    Britney Manriquez RN, [facility]

## 2019-04-25 ENCOUNTER — TELEPHONE (OUTPATIENT)
Dept: FAMILY MEDICINE | Facility: CLINIC | Age: 49
End: 2019-04-25

## 2019-04-25 NOTE — TELEPHONE ENCOUNTER
4/25/2019        Patient is aware of overdue preventative health screening and will call on their own time to schedule.         Outreach ,  Gertrude Britton

## 2019-07-23 ENCOUNTER — ANCILLARY PROCEDURE (OUTPATIENT)
Dept: GENERAL RADIOLOGY | Facility: CLINIC | Age: 49
End: 2019-07-23
Attending: FAMILY MEDICINE
Payer: COMMERCIAL

## 2019-07-23 ENCOUNTER — OFFICE VISIT (OUTPATIENT)
Dept: PEDIATRICS | Facility: CLINIC | Age: 49
End: 2019-07-23
Payer: COMMERCIAL

## 2019-07-23 VITALS
HEART RATE: 67 BPM | DIASTOLIC BLOOD PRESSURE: 80 MMHG | WEIGHT: 160.1 LBS | OXYGEN SATURATION: 100 % | BODY MASS INDEX: 25.13 KG/M2 | TEMPERATURE: 97.9 F | SYSTOLIC BLOOD PRESSURE: 123 MMHG

## 2019-07-23 DIAGNOSIS — S99.922A FOOT INJURY, LEFT, INITIAL ENCOUNTER: Primary | ICD-10-CM

## 2019-07-23 DIAGNOSIS — S99.912A INJURY OF LEFT ANKLE, INITIAL ENCOUNTER: ICD-10-CM

## 2019-07-23 DIAGNOSIS — S99.922A FOOT INJURY, LEFT, INITIAL ENCOUNTER: ICD-10-CM

## 2019-07-23 PROBLEM — M35.00 SICCA SYNDROME (H): Status: ACTIVE | Noted: 2017-02-28

## 2019-07-23 PROCEDURE — 99213 OFFICE O/P EST LOW 20 MIN: CPT | Performed by: FAMILY MEDICINE

## 2019-07-23 PROCEDURE — 73600 X-RAY EXAM OF ANKLE: CPT | Mod: LT | Performed by: RADIOLOGY

## 2019-07-23 PROCEDURE — 73630 X-RAY EXAM OF FOOT: CPT | Mod: LT | Performed by: RADIOLOGY

## 2019-07-23 ASSESSMENT — ANXIETY QUESTIONNAIRES
GAD7 TOTAL SCORE: 11
1. FEELING NERVOUS, ANXIOUS, OR ON EDGE: MORE THAN HALF THE DAYS
6. BECOMING EASILY ANNOYED OR IRRITABLE: SEVERAL DAYS
5. BEING SO RESTLESS THAT IT IS HARD TO SIT STILL: SEVERAL DAYS
2. NOT BEING ABLE TO STOP OR CONTROL WORRYING: SEVERAL DAYS
7. FEELING AFRAID AS IF SOMETHING AWFUL MIGHT HAPPEN: MORE THAN HALF THE DAYS
3. WORRYING TOO MUCH ABOUT DIFFERENT THINGS: MORE THAN HALF THE DAYS

## 2019-07-23 ASSESSMENT — PATIENT HEALTH QUESTIONNAIRE - PHQ9
SUM OF ALL RESPONSES TO PHQ QUESTIONS 1-9: 7
5. POOR APPETITE OR OVEREATING: MORE THAN HALF THE DAYS

## 2019-07-23 ASSESSMENT — PAIN SCALES - GENERAL: PAINLEVEL: MILD PAIN (2)

## 2019-07-23 NOTE — PATIENT INSTRUCTIONS
Rest the affected painful area as much as possible.  Apply ice for 15-20 minutes intermittently as needed and especially after any offending activity. Daily stretching.  As pain recedes, begin normal activities slowly as tolerated.  Ibuprofen 400-800 mg three times daily as needed.

## 2019-07-23 NOTE — PROGRESS NOTES
Subjective     Jacquie Goncalves is a 49 year old female who presents to clinic today for the following health issues:    HPI   Musculoskeletal problem/pain      Duration: since friday    Description  Location: left foot    Intensity:  mild    Accompanying signs and symptoms: swelling and Tenderness by end of day    History  Previous similar problem: YES- history osteoarthritis  Previous evaluation:  none    Precipitating or alleviating factors:  Trauma or overuse: YES- stepped off step wrong, fell to knee  Aggravating factors include: none    Therapies tried and outcome: rest/inactivity            Reviewed and updated as needed this visit by Provider         Review of Systems   ROS COMP: Constitutional, HEENT, cardiovascular, pulmonary, gi and gu systems are negative, except as otherwise noted.      Objective    /80   Pulse 67   Temp 97.9  F (36.6  C)   Wt 72.6 kg (160 lb 1.6 oz)   LMP  (LMP Unknown)   SpO2 100%   BMI 25.13 kg/m    Body mass index is 25.13 kg/m .  Physical Exam   GENERAL: healthy, alert and no distress  MS: Left dorsal foot around mid foot proximal to 3rd and 4th toe with swelling and tenderness with ecchymosis noted at  lateral mallelus    Results for orders placed or performed in visit on 12/15/16   XR Ankle Left G/E 3 Views    Narrative    XR ANKLE LT G/E 3 VW 12/15/2016 6:38 PM    HISTORY: Left ankle pain.    COMPARISON: None.    FINDINGS: Mortise joint is congruent. No fracture or malalignment. No  significant osseous degenerative change. Osseous structures are within  normal limits.      Impression    IMPRESSION: No specific finding to explain pain.    EDUARD ALEJANDRO MD             Assessment & Plan     1. Foot injury, left, initial encounter  OA vs Avulsion fracture, plan for ortho boot for 2 weeks if pain persist or antalgic gailt then repeat xray or order MRI depending on PE and symptoms.  Rest the affected painful area as much as possible.  Apply ice for 15-20 minutes  intermittently as needed and especially after any offending activity. Daily stretching.    - XR Foot Left G/E 3 Views; Future  - order for DME; Equipment being ordered: Walking ortho boot, short one preferable.  Dispense: 1 unit marking on an U-100 insulin syringe; Refill: 0    2. Injury of left ankle, initial encounter  Ankle sprain  - XR Foot Left G/E 3 Views; Future  - order for DME; Equipment being ordered: Walking ortho boot, short one preferable.  Dispense: 1 unit marking on an U-100 insulin syringe; Refill: 0         Return in about 2 weeks (around 8/6/2019), or if symptoms worsen or fail to improve.    Adry Joseph MD  [facility]

## 2019-07-24 ASSESSMENT — ANXIETY QUESTIONNAIRES: GAD7 TOTAL SCORE: 11

## 2019-09-01 DIAGNOSIS — K00.9 CONGENITAL ANOMALY OF TEETH: ICD-10-CM

## 2019-09-01 NOTE — TELEPHONE ENCOUNTER
Requested Prescriptions   Pending Prescriptions Disp Refills     Sod Fluoride-Potassium Nitrate (PREVIDENT 5000 SENSITIVE) 1.1-5 % PSTE [Pharmacy Med Name: PreviDent 5000 Sensitive Dental Paste 1.1-5 %] 100 mL 11     Sig: use to brush teeth daily       There is no refill protocol information for this order        Last Written Prescription Date:  6/22/18  Last Fill Quantity: 100 g,  # refills: 12   Last Office Visit with G, [facility] or [facility] prescribing provider:  7/23/19   Future Office Visit:           Jalil Powell  [facility]

## 2019-09-03 DIAGNOSIS — K00.9 CONGENITAL ANOMALY OF TEETH: ICD-10-CM

## 2019-09-04 NOTE — TELEPHONE ENCOUNTER
Requested Prescriptions   Pending Prescriptions Disp Refills     Sod Fluoride-Potassium Nitrate (PREVIDENT 5000 SENSITIVE) 1.1-5 % PSTE 100 g 12     Sig: USE DAILY       There is no refill protocol information for this order        Routing refill request to provider for review/approval because:  Drug not on the [facility] refill protocol     Chaz Barnett RN, BSN, PHN

## 2019-10-16 ENCOUNTER — TELEPHONE (OUTPATIENT)
Dept: FAMILY MEDICINE | Facility: CLINIC | Age: 49
End: 2019-10-16

## 2019-10-16 DIAGNOSIS — G47.00 INSOMNIA, UNSPECIFIED TYPE: ICD-10-CM

## 2019-10-16 NOTE — TELEPHONE ENCOUNTER
Routing refill request to provider for review/approval because:  Drug not on the [facility] refill protocol       Chaz Barnett RN, BSN, PHN

## 2019-10-16 NOTE — TELEPHONE ENCOUNTER
Requested Prescriptions   Pending Prescriptions Disp Refills     temazepam (RESTORIL) 15 MG capsule [Pharmacy Med Name: Temazepam Oral Capsule 15 MG] 30 capsule 4     Sig: TAKE ONE CAPSULE BY MOUTH AT BEDTIME AS NEEDED FOR SLEEP       There is no refill protocol information for this order        temazepam (RESTORIL) 15 MG capsule  Last Written Prescription Date:  4/12/19  Last Fill Quantity: 30,  # refills: 5   Last office visit: 4/12/2019 with prescribing provider:  HARMEET Rush   Future Office Visit:

## 2019-10-16 NOTE — LETTER
[facility]  [address] [place] [address]  [id number]  [phone]    October 17, 2019      Jacquie Goncalves  [address]  [place] [address]      Dear Jacquie,    We recently received a call from your pharmacy requesting a refill of your medication.    A review of your chart indicates that an appointment is required with your provider.  Please call the clinic at [phone] to schedule your appointment.    We have authorized one refill of your medication to allow time for you to schedule.   If you have a history of diabetes or high cholesterol, please come in fasting for the appointment. Fasting entails nothing to eat or drink 8 hours prior to your appointment; with the exception on water. You may take your medication the day of the appointment.    Thank you,      Sylwia Cutler M.D.

## 2019-10-17 RX ORDER — TEMAZEPAM 15 MG/1
CAPSULE ORAL
Qty: 30 CAPSULE | Refills: 0 | Status: SHIPPED | OUTPATIENT
Start: 2019-10-17 | End: 2019-11-14

## 2019-11-14 DIAGNOSIS — G47.00 INSOMNIA, UNSPECIFIED TYPE: ICD-10-CM

## 2019-11-14 RX ORDER — TEMAZEPAM 15 MG/1
CAPSULE ORAL
Qty: 30 CAPSULE | Refills: 0 | Status: SHIPPED | OUTPATIENT
Start: 2019-11-14 | End: 2019-11-22

## 2019-11-14 NOTE — TELEPHONE ENCOUNTER
Reason for Call:  Medication or medication refill:    Do you use a [facility]?  Name of the pharmacy and phone number for the current request:  [id number] - [place], [place] - [address]     Name of the medication requested: Pending Prescriptions:                       Disp   Refills    temazepam (RESTORIL) 15 MG capsule        30 cap*0          Other request: Cant get in soon enough for this refill but have scheduled an appointment. Can you please refill will be out in 2 days.  Please call when approved.    Can we leave a detailed message on this number? YES    Phone number patient can be reached at: Cell number on file:    Telephone Information:   Mobile [phone]     Best Time: any    Call taken on 11/14/2019 at 3:41 PM by Tracey Rojo

## 2019-11-14 NOTE — TELEPHONE ENCOUNTER
Requested Prescriptions   Pending Prescriptions Disp Refills     temazepam (RESTORIL) 15 MG capsule 30 capsule 0       There is no refill protocol information for this order        Routing refill request to provider for review/approval because:  Drug not on the [facility] refill protocol   Next 5 appointments (look out 90 days)    Nov 22, 2019  3:00 PM CST  Office Visit with Sylwia Cutler MD  [facility] [address] [place] [id number] [phone]            Chaz Barnett RN, BSN, PHN

## 2019-11-22 ENCOUNTER — OFFICE VISIT (OUTPATIENT)
Dept: FAMILY MEDICINE | Facility: CLINIC | Age: 49
End: 2019-11-22
Payer: COMMERCIAL

## 2019-11-22 VITALS
TEMPERATURE: 98.1 F | WEIGHT: 163 LBS | SYSTOLIC BLOOD PRESSURE: 117 MMHG | HEIGHT: 67 IN | OXYGEN SATURATION: 97 % | BODY MASS INDEX: 25.58 KG/M2 | HEART RATE: 97 BPM | DIASTOLIC BLOOD PRESSURE: 72 MMHG

## 2019-11-22 DIAGNOSIS — G47.00 INSOMNIA, UNSPECIFIED TYPE: ICD-10-CM

## 2019-11-22 DIAGNOSIS — F41.9 ANXIETY: ICD-10-CM

## 2019-11-22 DIAGNOSIS — F98.8 ATTENTION DEFICIT DISORDER (ADD) WITHOUT HYPERACTIVITY: Primary | ICD-10-CM

## 2019-11-22 DIAGNOSIS — F32.1 MODERATE MAJOR DEPRESSION (H): ICD-10-CM

## 2019-11-22 PROCEDURE — 99214 OFFICE O/P EST MOD 30 MIN: CPT | Performed by: FAMILY MEDICINE

## 2019-11-22 RX ORDER — TEMAZEPAM 15 MG/1
CAPSULE ORAL
Qty: 90 CAPSULE | Refills: 1 | Status: SHIPPED | OUTPATIENT
Start: 2019-11-22 | End: 2020-06-01

## 2019-11-22 RX ORDER — DEXTROAMPHETAMINE SACCHARATE, AMPHETAMINE ASPARTATE, DEXTROAMPHETAMINE SULFATE AND AMPHETAMINE SULFATE 2.5; 2.5; 2.5; 2.5 MG/1; MG/1; MG/1; MG/1
10 TABLET ORAL 2 TIMES DAILY
Qty: 60 TABLET | Refills: 0 | Status: SHIPPED | OUTPATIENT
Start: 2019-11-22 | End: 2020-06-29

## 2019-11-22 RX ORDER — CETIRIZINE HYDROCHLORIDE 10 MG/1
10 TABLET ORAL DAILY
COMMUNITY

## 2019-11-22 RX ORDER — LORAZEPAM 0.5 MG/1
0.5 TABLET ORAL 3 TIMES DAILY PRN
Qty: 30 TABLET | Refills: 0 | Status: SHIPPED | OUTPATIENT
Start: 2019-11-22 | End: 2020-06-16

## 2019-11-22 ASSESSMENT — MIFFLIN-ST. JEOR: SCORE: 1385.86

## 2019-11-22 NOTE — PROGRESS NOTES
Subjective     Jacquie Goncalves is a 49 year old female who presents to clinic today for the following health issues:    HPI   Insomnia Follow-Up    Description:   Time to fall asleep (sleep latency): Patient takes pill 1 hour before bedtime and it takes 30 minutes to fall asleep   Middle of night awakening:  YES  Early morning awakening:  no     Progression of Symptoms:  improving    Accompanying Signs & Symptoms:  Daytime sleepiness/napping: no   Excessive snoring/apnea: no   Restless legs: YES- sometimes   Frequent urination: no   Chronic pain:  YES    Precipitating factors:   New stressful situation: no   Caffeine intake: YES- diet soda but never around bedtime   OTC decongestants: no   Any new medications: no     Alleviating factors:  Self medicating (alcohol, etc.):  no    Therapies Tried and outcome: Temazepam       Medication Followup of Ritalin     Taking Medication as prescribed: NO- patient had stopped taking 4 years ago and would like to discuss restarting, patient has old ritalin from the past that she has been taking.     Side Effects:  None    Medication Helping Symptoms:  Yes- helped in the past      SUBJECTIVE:  Here today in follow-up of insomnia, anxiety, depression, ADD.  Patient well-known to me.  Nearly 2 years since her  passed away from cancer and she is doing a lot better.  Happy with her current dosage of medication for anxiety.  Still using temazepam at night for sleep and has no residual side effects.  Has had issues of ADD for quite some time.  She is now back in the working world and finds that using a little bit of the Ritalin that she had previously is effective in helping reduce some anxiety and pressured speech that she has during meetings, etc.  But she is wondering about other agents being a little bit more effective.    Review of systems otherwise negative.  Past medical, family, and social history reviewed and updated in chart.    OBJECTIVE:  /72 (BP Location:  "Right arm, Patient Position: Chair, Cuff Size: Adult Regular)   Pulse 97   Temp 98.1  F (36.7  C) (Oral)   Ht 1.7 m (5' 6.93\")   Wt 73.9 kg (163 lb)   LMP  (LMP Unknown)   SpO2 97%   Breastfeeding No   BMI 25.58 kg/m    Alert, pleasant, upbeat, and in no apparent discomfort.  S1 and S2 normal, no murmurs, clicks, gallops or rubs. Regular rate and rhythm. Chest is clear; no wheezes or rales. No edema or JVD.  Past labs reviewed with the patient.     ASSESSMENT / PLAN:  (F98.8) Attention deficit disorder (ADD) without hyperactivity  (primary encounter diagnosis)  Comment: Discussed options and she actually would probably do better with a pure stimulant such as Adderall.  This might even be a little bit cheaper than the Ritalin she has been using and we could try 5 to 10 mg twice daily as needed  Plan: amphetamine-dextroamphetamine (ADDERALL) 10 MG         tablet        Discussed mechanism of action of the proposed medication, as well as potential effects, both good and bad.  Patient expressed understanding and agreed with treatment.     (F41.9) Anxiety  Comment: Stable and following  Plan:     (F32.1) Moderate major depression (H)  Comment: Doing well on current regimen.  Continue same  Plan:     (G47.00) Insomnia, unspecified type  Comment:   Plan: temazepam (RESTORIL) 15 MG capsule            Follow up 6 months  ESPERANZA Cutler MD    (Chart documentation completed in part with Dragon voice-recognition software.  Even though reviewed some grammatical, spelling, and word errors may remain.)     "

## 2020-03-12 DIAGNOSIS — F32.1 MODERATE MAJOR DEPRESSION (H): ICD-10-CM

## 2020-03-12 DIAGNOSIS — F41.9 ANXIETY: ICD-10-CM

## 2020-03-12 NOTE — TELEPHONE ENCOUNTER
"Requested Prescriptions   Pending Prescriptions Disp Refills     sertraline (ZOLOFT) 100 MG tablet [Pharmacy Med Name: Sertraline HCl Oral Tablet 100 MG] 30 tablet 2     Sig: Take 1 tablet by mouth daily       SSRIs Protocol Failed - 3/12/2020  7:01 AM        Failed - PHQ-9 score less than 5 in past 6 months     Please review last PHQ-9 score.           Passed - Medication is active on med list        Passed - Patient is age 18 or older        Passed - No active pregnancy on record        Passed - No positive pregnancy test in last 12 months        Passed - Recent (6 mo) or future (30 days) visit within the authorizing provider's specialty     Patient had office visit in the last 6 months or has a visit in the next 30 days with authorizing provider or within the authorizing provider's specialty.  See \"Patient Info\" tab in inbasket, or \"Choose Columns\" in Meds & Orders section of the refill encounter.               sertraline (ZOLOFT) 100 MG tablet  Last Written Prescription Date:  4/12/19  Last Fill Quantity: 90,  # refills: 3   Last office visit: 11/22/2019 with prescribing provider:  Dr. Cutler   Future Office Visit:      PHQ-9 score:    PHQ 7/23/2019   PHQ-9 Total Score 7   Q9: Thoughts of better off dead/self-harm past 2 weeks Not at all             SLOAN-7 SCORE 11/7/2017 6/20/2018 7/23/2019   Total Score - - -   Total Score 12 9 11         "

## 2020-03-16 NOTE — TELEPHONE ENCOUNTER
Routing refill request to provider for review/approval because:  PHQ-9 failed.    Britney Manriquez RN, [facility] Triage

## 2020-03-17 RX ORDER — SERTRALINE HYDROCHLORIDE 100 MG/1
TABLET, FILM COATED ORAL
Qty: 30 TABLET | Refills: 2 | Status: SHIPPED | OUTPATIENT
Start: 2020-03-17 | End: 2020-04-20

## 2020-04-20 ENCOUNTER — TELEPHONE (OUTPATIENT)
Dept: FAMILY MEDICINE | Facility: CLINIC | Age: 50
End: 2020-04-20

## 2020-04-20 DIAGNOSIS — F41.9 ANXIETY: ICD-10-CM

## 2020-04-20 DIAGNOSIS — F32.1 MODERATE MAJOR DEPRESSION (H): ICD-10-CM

## 2020-04-20 RX ORDER — SERTRALINE HYDROCHLORIDE 100 MG/1
100 TABLET, FILM COATED ORAL DAILY
Qty: 30 TABLET | Refills: 1 | Status: SHIPPED | OUTPATIENT
Start: 2020-04-20 | End: 2020-06-19

## 2020-04-20 NOTE — TELEPHONE ENCOUNTER
Requested Prescriptions   Signed Prescriptions Disp Refills    sertraline (ZOLOFT) 100 MG tablet 30 tablet 1     Sig: Take 1 tablet (100 mg) by mouth daily       There is no refill protocol information for this order        Prescription approved per [facility] Refill Protocol.      Chaz Barnett RN, BSN, PHN

## 2020-04-20 NOTE — TELEPHONE ENCOUNTER
Reason for call:  Medication   If this is a refill request, has the caller requested the refill from the pharmacy already? Yes  Will the patient be using a [facility]? No  Name of the pharmacy and phone number for the current request: [facility] - on file    Name of the medication requested: sertraline (ZOLOFT) 100 MG tablet    Other request: Patient put in refill request about a month ago, it state it was refilled, but the pharmacy never received it. Wondering if you can put in another refill. Please call patient to confirm. Thank you!    Phone number to reach patient:  Cell number on file:    Telephone Information:   Mobile [phone]       Best Time:  anytime    Can we leave a detailed message on this number?  YES    Travel screening: Not Applicable

## 2020-05-31 DIAGNOSIS — G47.00 INSOMNIA, UNSPECIFIED TYPE: ICD-10-CM

## 2020-06-01 RX ORDER — TEMAZEPAM 15 MG/1
CAPSULE ORAL
Qty: 30 CAPSULE | Refills: 0 | Status: SHIPPED | OUTPATIENT
Start: 2020-06-01 | End: 2020-06-29

## 2020-06-01 NOTE — TELEPHONE ENCOUNTER
Requested Prescriptions   Pending Prescriptions Disp Refills     temazepam (RESTORIL) 15 MG capsule [Pharmacy Med Name: Temazepam Oral Capsule 15 MG] 90 capsule 0     Sig: TAKE 1 CAPSULE BY MOUTH AT BEDTIME AS NEEDED FOR SLEEP       There is no refill protocol information for this order        Routing refill request to provider for review/approval because:  Drug not on the [facility] refill protocol     Chaz Barnett RN, BSN, PHN

## 2020-06-18 DIAGNOSIS — F32.1 MODERATE MAJOR DEPRESSION (H): ICD-10-CM

## 2020-06-18 DIAGNOSIS — F41.9 ANXIETY: ICD-10-CM

## 2020-06-19 RX ORDER — SERTRALINE HYDROCHLORIDE 100 MG/1
100 TABLET, FILM COATED ORAL DAILY
Qty: 30 TABLET | Refills: 0 | Status: SHIPPED | OUTPATIENT
Start: 2020-06-19 | End: 2020-06-29

## 2020-06-29 ENCOUNTER — VIRTUAL VISIT (OUTPATIENT)
Dept: FAMILY MEDICINE | Facility: CLINIC | Age: 50
End: 2020-06-29
Payer: COMMERCIAL

## 2020-06-29 DIAGNOSIS — G47.00 INSOMNIA, UNSPECIFIED TYPE: ICD-10-CM

## 2020-06-29 DIAGNOSIS — F32.1 MODERATE MAJOR DEPRESSION (H): ICD-10-CM

## 2020-06-29 DIAGNOSIS — F41.9 ANXIETY: ICD-10-CM

## 2020-06-29 DIAGNOSIS — K00.9 CONGENITAL ANOMALY OF TEETH: Primary | ICD-10-CM

## 2020-06-29 PROCEDURE — 99214 OFFICE O/P EST MOD 30 MIN: CPT | Mod: 95 | Performed by: NURSE PRACTITIONER

## 2020-06-29 PROCEDURE — 96127 BRIEF EMOTIONAL/BEHAV ASSMT: CPT | Performed by: NURSE PRACTITIONER

## 2020-06-29 RX ORDER — SERTRALINE HYDROCHLORIDE 100 MG/1
100 TABLET, FILM COATED ORAL DAILY
Qty: 30 TABLET | Refills: 5 | Status: SHIPPED | OUTPATIENT
Start: 2020-06-29 | End: 2020-12-22

## 2020-06-29 RX ORDER — TEMAZEPAM 15 MG/1
CAPSULE ORAL
Qty: 30 CAPSULE | Refills: 1 | Status: SHIPPED | OUTPATIENT
Start: 2020-06-29 | End: 2020-08-26

## 2020-06-29 ASSESSMENT — ANXIETY QUESTIONNAIRES
2. NOT BEING ABLE TO STOP OR CONTROL WORRYING: NOT AT ALL
7. FEELING AFRAID AS IF SOMETHING AWFUL MIGHT HAPPEN: NOT AT ALL
GAD7 TOTAL SCORE: 2
5. BEING SO RESTLESS THAT IT IS HARD TO SIT STILL: NOT AT ALL
1. FEELING NERVOUS, ANXIOUS, OR ON EDGE: MORE THAN HALF THE DAYS
6. BECOMING EASILY ANNOYED OR IRRITABLE: NOT AT ALL
3. WORRYING TOO MUCH ABOUT DIFFERENT THINGS: NOT AT ALL

## 2020-06-29 ASSESSMENT — PATIENT HEALTH QUESTIONNAIRE - PHQ9
5. POOR APPETITE OR OVEREATING: NOT AT ALL
SUM OF ALL RESPONSES TO PHQ QUESTIONS 1-9: 5

## 2020-06-29 NOTE — PROGRESS NOTES
"Jacquie Goncalves is a 50 year old female who is being evaluated via a billable telephone visit.      The patient has been notified of following:     \"This telephone visit will be conducted via a call between you and your physician/provider. We have found that certain health care needs can be provided without the need for a physical exam.  This service lets us provide the care you need with a short phone conversation.  If a prescription is necessary we can send it directly to your pharmacy.  If lab work is needed we can place an order for that and you can then stop by our lab to have the test done at a later time.    Telephone visits are billed at different rates depending on your insurance coverage. During this emergency period, for some insurers they may be billed the same as an in-person visit.  Please reach out to your insurance provider with any questions.    If during the course of the call the physician/provider feels a telephone visit is not appropriate, you will not be charged for this service.\"    Patient has given verbal consent for Telephone visit?  Yes    What phone number would you like to be contacted at? [phone]    How would you like to obtain your AVS? Mail a copy    Subjective     Jacquie Goncalves is a 50 year old female who presents via phone visit today for the following health issues:    HPI  Anxiety Follow-Up    How are you doing with your anxiety since your last visit? Improved to same    Are you having other symptoms that might be associated with anxiety? Yes:  depression    Have you had a significant life event? No     Are you feeling depressed? YES, anniversary of husbands death    Do you have any concerns with your use of alcohol or other drugs? No    Social History     Tobacco Use     Smoking status: Never Smoker     Smokeless tobacco: Never Used   Substance Use Topics     Alcohol use: Yes     Comment: Wine 1-2 glasses per week     Drug use: No     SLOAN-7 SCORE 6/20/2018 7/23/2019 " 6/29/2020   Total Score - - -   Total Score 9 11 2     PHQ 6/20/2018 7/23/2019 6/29/2020   PHQ-9 Total Score 5 7 5   Q9: Thoughts of better off dead/self-harm past 2 weeks Not at all Not at all Not at all     Last PHQ-9 6/29/2020   1.  Little interest or pleasure in doing things 0   2.  Feeling down, depressed, or hopeless 0   3.  Trouble falling or staying asleep, or sleeping too much 3   4.  Feeling tired or having little energy 2   5.  Poor appetite or overeating 0   6.  Feeling bad about yourself 0   7.  Trouble concentrating 0   8.  Moving slowly or restless 0   Q9: Thoughts of better off dead/self-harm past 2 weeks 0   PHQ-9 Total Score 5   Difficulty at work, home, or with people -     SLOAN-7  6/29/2020   1. Feeling nervous, anxious, or on edge 2   2. Not being able to stop or control worrying 0   3. Worrying too much about different things 0   4. Trouble relaxing 0   5. Being so restless that it is hard to sit still 0   6. Becoming easily annoyed or irritable 0   7. Feeling afraid, as if something awful might happen 0   SLOAN-7 Total Score 2   If you checked any problems, how difficult have they made it for you to do your work, take care of things at home, or get along with other people? -         How many servings of fruits and vegetables do you eat daily?  2-3    On average, how many sweetened beverages do you drink each day (Examples: soda, juice, sweet tea, etc.  Do NOT count diet or artificially sweetened beverages)?   0    How many days per week do you exercise enough to make your heart beat faster? 3 or less    How many minutes a day do you exercise enough to make your heart beat faster? 9 or less    How many days per week do you miss taking your medication? 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    Patient Active Problem List   Diagnosis     Psoriasis     ADD  (attention deficit disorder)     GERD (gastroesophageal reflux disease)     Anxiety     Osteoarthritis     Basal cell carcinoma of skin     CARDIOVASCULAR SCREENING; LDL GOAL LESS THAN 160     Moderate major depression (H)     Vitamin D deficiency     Sjogren's syndrome (H)     Xerostomia     Chronic rhinitis     Iron deficiency     History of basal cell carcinoma     Pain in joint     Paresthesia     Sicca syndrome (H)     Past Surgical History:   Procedure Laterality Date     SURGICAL HISTORY OF -       jaw surgery       Social History     Tobacco Use     Smoking status: Never Smoker     Smokeless tobacco: Never Used   Substance Use Topics     Alcohol use: Yes     Comment: Wine 1-2 glasses per week     Family History   Problem Relation Age of Onset     Diabetes Mother      Lupus Mother         discoid     Neurologic Disorder Sister         MS         Current Outpatient Medications   Medication Sig Dispense Refill     Cholecalciferol (VITAMIN D) 2000 UNIT CAPS Take 2,000 Units by mouth daily.       LORazepam (ATIVAN) 0.5 MG tablet Take 1 tablet (0.5 mg) by mouth 3 times daily as needed for anxiety 30 tablet 0     PREVIDENT 5000 SENSITIVE 1.1-5 % PSTE Brush teeth twice daily. 112 g 5     sertraline (ZOLOFT) 100 MG tablet Take 1 tablet (100 mg) by mouth daily 30 tablet 5     temazepam (RESTORIL) 15 MG capsule TAKE 1 CAPSULE BY MOUTH AT BEDTIME AS NEEDED FOR SLEEP 30 capsule 1     B Complex Vitamins (B COMPLEX 1 PO) Take  by mouth.       cetirizine (ZYRTEC) 10 MG tablet Take 10 mg by mouth daily       cycloSPORINE (RESTASIS) 0.05 % ophthalmic emulsion 1 drop 2 times daily.       PROAIR  (90 BASE) MCG/ACT IN AERS 2 puffs twice daily as needed (Patient not taking: No sig reported) 2 12     Probiotic Product (PROBIOTIC FORMULA PO) Take 1 tablet by mouth daily.       Allergies   Allergen Reactions     Latex        Reviewed and updated as needed this visit by Provider         Review of Systems   Constitutional,  HEENT, cardiovascular, pulmonary, gi and gu systems are negative, except as otherwise noted.       Objective   Reported vitals:  There were no vitals taken for this visit.   healthy, alert and no distress  PSYCH: Alert and oriented times 3; coherent speech, normal   rate and volume, able to articulate logical thoughts, able   to abstract reason, no tangential thoughts, no hallucinations   or delusions  Her affect is normal  RESP: No cough, no audible wheezing, able to talk in full sentences  Remainder of exam unable to be completed due to telephone visits    Diagnostic Test Results:  Labs reviewed in Epic        Assessment/Plan:  1. Insomnia, unspecified type  Stable. Refilled.   - temazepam (RESTORIL) 15 MG capsule; TAKE 1 CAPSULE BY MOUTH AT BEDTIME AS NEEDED FOR SLEEP  Dispense: 30 capsule; Refill: 1    2. Anxiety  Stable. Refilled.   - sertraline (ZOLOFT) 100 MG tablet; Take 1 tablet (100 mg) by mouth daily  Dispense: 30 tablet; Refill: 5    3. Moderate major depression (H)  Stable and controlled. Refilled.  - sertraline (ZOLOFT) 100 MG tablet; Take 1 tablet (100 mg) by mouth daily  Dispense: 30 tablet; Refill: 5    4. Congenital anomaly of teeth  Refilled.  - PREVIDENT 5000 SENSITIVE 1.1-5 % PSTE; Brush teeth twice daily.  Dispense: 112 g; Refill: 5    Return in about 6 months (around 12/29/2020).    The benefits, risks and potential side effects were discussed in detail. Black box warnings discussed as relevant. All patient questions were answered. The patient was instructed to follow up immediately if any adverse reactions develop.    Return precautions discussed, including when to seek urgent/emergent care.    Patient verbalizes understanding and agrees with plan of care.       Phone call duration:  7 minutes    MALDONADO Ray CNP

## 2020-06-30 ASSESSMENT — ANXIETY QUESTIONNAIRES: GAD7 TOTAL SCORE: 2

## 2020-08-24 DIAGNOSIS — G47.00 INSOMNIA, UNSPECIFIED TYPE: ICD-10-CM

## 2020-08-24 NOTE — TELEPHONE ENCOUNTER
Routing refill request to provider for review/approval because:  Drug not on the [facility] refill protocol   Anne Loja RN  [facility]

## 2020-08-26 RX ORDER — TEMAZEPAM 15 MG/1
CAPSULE ORAL
Qty: 30 CAPSULE | Refills: 2 | Status: SHIPPED | OUTPATIENT
Start: 2020-08-26 | End: 2020-11-27

## 2020-08-26 NOTE — TELEPHONE ENCOUNTER
Patient contacted and states she checked with pharmacy first and was told there is no refills.    Jero Victor CMA

## 2020-08-26 NOTE — TELEPHONE ENCOUNTER
Requested Prescriptions   Pending Prescriptions Disp Refills     temazepam (RESTORIL) 15 MG capsule 30 capsule 1     Sig: TAKE 1 CAPSULE BY MOUTH AT BEDTIME AS NEEDED FOR SLEEP       There is no refill protocol information for this order        Routing refill request to provider for review/approval because:  Drug not on the [facility] refill protocol   Also see message below-pharmacy told patient that they don't have a refill on file for her    Chaz Barnett RN, BSN, PHN

## 2020-10-03 ENCOUNTER — VIRTUAL VISIT (OUTPATIENT)
Dept: FAMILY MEDICINE | Facility: OTHER | Age: 50
End: 2020-10-03
Payer: COMMERCIAL

## 2020-10-03 PROCEDURE — 99421 OL DIG E/M SVC 5-10 MIN: CPT | Performed by: PHYSICIAN ASSISTANT

## 2020-10-03 NOTE — PROGRESS NOTES
"Date: 10/03/2020 12:20:49  Clinician: Nan Galindo  Clinician NPI: [id number]  Patient: Jacquie Goncalves  Patient : 1970  Patient Address: [address], [place] [address]  Patient Phone: [phone]  Visit Protocol: URI  Patient Summary:  Jacquie is a 50 year old ( : 1970 ) female who initiated a [facility] Visit for COVID-19 (Coronavirus) evaluation and screening. When asked the question \"Please sign me up to receive news, health information and promotions from [facility].\", Jacquie responded \"No\".    When asked when her symptoms started, Jacquie reported that she does not have any symptoms.   She denies taking antibiotic medication in the past month and having recent facial or sinus surgery in the past 60 days.    Pertinent COVID-19 (Coronavirus) information  In the past 14 days, Jacquie has not worked in a congregate living setting.   She does not work or volunteer as healthcare worker or a  and does not work or volunteer in a healthcare facility.   Jacquie also has not lived in a congregate living setting in the past 14 days. She does not live with a healthcare worker.   Jacquie has had a close contact with a laboratory-confirmed COVID-19 patient in the last 14 days. Additional information about contact with COVID-19 (Coronavirus) patient as reported by the patient (free text): A coworker has mild symptoms, was tested on  and was informed of  a positive result today, Sat. Oct. 3.   Patient reported they are not living in the same household with a COVID-19 positive patient.  Patient was in an enclosed space for greater than 15 minutes with a COVID-19 patient.  Since 2019, Jacquie and has not had upper respiratory infection or influenza-like illness. Has not been diagnosed with lab-confirmed COVID-19 test   Pertinent medical history  Jacquie does not get yeast infections when she takes antibiotics.   Jacquie does not need a return to work/school " note.   Weight: 155 lbs   Jacquie does not smoke or use smokeless tobacco.   Weight: 155 lbs    MEDICATIONS: sertraline oral, ALLERGIES: NKDA  Clinician Response:  Dear Jacquie,   Based on your exposure to COVID-19 (coronavirus), we would like to test you for this virus.  1. Please call [phone] to schedule your visit. Explain that you were referred by [facility] to have a COVID-19 test. Be ready to share [facility].  The following will serve as your written order for this COVID Test, ordered by me, for the indication of suspected COVID [Z20.828]: The test will be ordered in [facility], our electronic health record, after you are scheduled. It will show as ordered and authorized by Magdiel De La Rosa MD.  Order: COVID-19 (coronavirus) PCR for ASYMPTOMATIC EXPOSURE testing from [facility].  If you know you have had close contact with someone who tested positive, you should be quarantined for 14 days after this exposure. You should stay in quarantine for the14 days even if the covid test is negative, the optimal time to test after exposure is 5-7 days from the exposure  Quarantine means   What should I do?  For safety, it's very important to follow these rules. Do this for 14 days after the date you were last exposed to the virus..  Stay home and away from others. Don't go to school or anywhere else. Generally quarantine means staying home from work but there are some exceptions to this. Please contact your workplace.   No hugging, kissing or shaking hands.  Don't let anyone visit.  Cover your mouth and nose with a mask, tissue or washcloth to avoid spreading germs.  Wash your hands and face often. Use soap and water.  What are the symptoms of COVID-19?  The most common symptoms are cough, fever and trouble breathing. Less common symptoms include headache, body aches, fatigue (feeling very tired), chills, sore throat, stuffy or runny nose, diarrhea (loose poop), loss of taste or smell, belly pain, and nausea or  vomiting (feeling sick to your stomach or throwing up).  After 14 days, if you have still don't have symptoms, you likely don't have this virus.  If you develop symptoms, follow these guidelines.  If you're normally healthy: Please start another [facility] visit to report your symptoms. Go to [email or web address].  If you have a serious health problem (like cancer, heart failure, an organ transplant or kidney disease): Call your specialty clinic. Let them know that you might have COVID-19.  2. When it's time for your COVID test:  Stay at least 6 feet away from others. (If someone will drive you to your test, stay in the backseat, as far away from the  as you can.)  Cover your mouth and nose with a mask, tissue or washcloth.  Go straight to the testing site. Don't make any stops on the way there or back.  Please note  Caregivers in these groups are at risk for severe illness due to COVID-19:  o People 65 years and older  o People who live in a nursing home or long-term care facility  o People with chronic disease (lung, heart, cancer, diabetes, kidney, liver, immunologic)  o People who have a weakened immune system, including those who:  Are in cancer treatment  Take medicine that weakens the immune system, such as corticosteroids  Had a bone marrow or organ transplant  Have an immune deficiency  Have poorly controlled HIV or AIDS  Are obese (body mass index of 40 or higher)  Smoke regularly  Where can I get more information?  [facility] -- About COVID-19: [email or web address]  CDC -- What to Do If You're Sick: www.cdc.gov/coronavirus/2019-ncov/about/steps-when-sick.html  CDC -- Ending Home Isolation: www.cdc.gov/coronavirus/2019-ncov/hcp/disposition-in-home-patients.html  CDC -- Caring for Someone: www.cdc.gov/coronavirus/2019-ncov/if-you-are-sick/care-for-someone.html  [facility] -- Interim Guidance for Hospital Discharge to Home: www.health.[facility].[place]/diseases/coronavirus/hcp/hospdischarge.pdf  [facility]  [place] clinical trials (COVID-19 research studies): clinicalaffairs.[facility]-clinical-trials  Below are the COVID-19 hotlines at the [facility] of Health ([facility]). Interpreters are available.  For health questions: Call 689-117-7395 or 1-154.384.5367 (7 a.m. to 7 p.m.)  For questions about schools and childcare: Call 606-831-9081 or 1-573.564.7768 (7 a.m. to 7 p.m.)    Diagnosis: Contact with and (suspected) exposure to other viral communicable diseases  Diagnosis ICD: Z20.828

## 2020-10-04 DIAGNOSIS — Z20.822 SUSPECTED COVID-19 VIRUS INFECTION: ICD-10-CM

## 2020-10-04 DIAGNOSIS — Z20.822 SUSPECTED COVID-19 VIRUS INFECTION: Primary | ICD-10-CM

## 2020-10-05 ENCOUNTER — TELEPHONE (OUTPATIENT)
Dept: FAMILY MEDICINE | Facility: CLINIC | Age: 50
End: 2020-10-05

## 2020-10-05 LAB
SARS-COV-2 RNA SPEC QL NAA+PROBE: NORMAL
SPECIMEN SOURCE: NORMAL

## 2020-10-05 NOTE — TELEPHONE ENCOUNTER
Patient was tested on 10/4 and the swab was QNS (Quantity Not Sufficient.). We need to re-swab the patient and get a better sample.  [facility] said they could get her in this afternoon possibly.    Called patient and left message to call the clinic back to ask for Emily or Courtney and talk with Patient.  SEUN/MA

## 2020-10-06 NOTE — TELEPHONE ENCOUNTER
Left message with patient to call back on home and cell. Spoke to Breanna, Supervisor at [facility] and was able to get patient in at 2pm today.  Courtney Simmons    10/6/2020 1219p  Patient called back, she prefers not to get tested again. Had a bloody nose for a few days after due to herShe is not symptomatic, this was more of a precaution test d/t exposure. She will wait to see if she develops any symptoms before getting tested again.    Courtney Simmons

## 2020-11-17 NOTE — TELEPHONE ENCOUNTER
30 day supply sent to pharmacy.       Chaz Barnett RN, BSN, PHN    
Patient called back to scheduled virtual appointment. Patient states she only has 2 days left of medication and wont have enough until her virtual appointment.  
Team to please call patient and schedule medication recheck appointment. Please find out if patient has enough medication to last until appointment once scheduled then route back to refill pool. Thank you.        Chaz Barnett RN, BSN, PHN     
This writer attempted to contact pt on 06/19/20      Reason for call nicolas virtual visit and left message.      If patient calls back:   Schedule virtual appointment within 2 weeks with PCP, document that pt called and Please ask if there is enough medication/supplies to last till scheduled appointment, then route back to RN refill cassie Martin    
Encourage po intake, monitor diet tolerance, and provide assistance at meals as needed. Obtain daily weights to monitor trends.

## 2020-12-20 DIAGNOSIS — F41.9 ANXIETY: ICD-10-CM

## 2020-12-20 DIAGNOSIS — F32.1 MODERATE MAJOR DEPRESSION (H): ICD-10-CM

## 2020-12-20 NOTE — LETTER
December 22, 2020      Jacquie Goncalves  [address]  [facility] [address]        Dear ,      At [facility] we care about your health and are committed to providing quality patient care. Regular appointments are a vital part of the care and management of your health and can help prevent many of the complications that can occur.       It has come to our attention that you have been given a one time refill of sertraline (ZOLOFT) 100 MG tablet and that you are due for a visit with your provider for further refills.  Please call [facility] at [phone] or use "[employer] to schedule your appointment.       Sincerely,   [facility] Care Team

## 2020-12-22 RX ORDER — SERTRALINE HYDROCHLORIDE 100 MG/1
100 TABLET, FILM COATED ORAL DAILY
Qty: 30 TABLET | Refills: 0 | Status: SHIPPED | OUTPATIENT
Start: 2020-12-22 | End: 2021-01-22

## 2020-12-22 NOTE — TELEPHONE ENCOUNTER
Reminder letter to schedule needed appt for further refills mailed to pt's home address.      Sherrie BISHOP (R))

## 2020-12-22 NOTE — TELEPHONE ENCOUNTER
Routing refill request to provider for review/approval because:  PHQ-9 score is 5, fails protocol    PHQ-9 score:    PHQ 6/29/2020   PHQ-9 Total Score 5   Q9: Thoughts of better off dead/self-harm past 2 weeks Not at all         Snehal Bryan RN  [facility]

## 2020-12-26 DIAGNOSIS — G47.00 INSOMNIA, UNSPECIFIED TYPE: ICD-10-CM

## 2020-12-27 RX ORDER — TEMAZEPAM 15 MG/1
15 CAPSULE ORAL
Qty: 30 CAPSULE | Refills: 0 | Status: SHIPPED | OUTPATIENT
Start: 2020-12-27 | End: 2021-01-26

## 2020-12-28 NOTE — TELEPHONE ENCOUNTER
Requested Prescriptions   Pending Prescriptions Disp Refills     temazepam (RESTORIL) 15 MG capsule 30 capsule 0       There is no refill protocol information for this order              Last Written Prescription Date:  11/27/20  Last Fill Quantity: 30,   # refills: 0  Last Office Visit: 10/3/20  Future Office visit:       Routing refill request to provider for review/approval because:  Drug not on the [facility] refill protocol or controlled substance

## 2021-01-22 DIAGNOSIS — F32.1 MODERATE MAJOR DEPRESSION (H): ICD-10-CM

## 2021-01-22 DIAGNOSIS — F41.9 ANXIETY: ICD-10-CM

## 2021-01-22 RX ORDER — SERTRALINE HYDROCHLORIDE 100 MG/1
100 TABLET, FILM COATED ORAL DAILY
Qty: 30 TABLET | Refills: 0 | Status: SHIPPED | OUTPATIENT
Start: 2021-01-22 | End: 2021-01-26

## 2021-01-22 NOTE — TELEPHONE ENCOUNTER
"Routing refill request to provider for review/approval because:  Failed protocol.  No future appointment scheduled. Please advise. Thank you. Sybil Macario R.N.  Requested Prescriptions   Pending Prescriptions Disp Refills    sertraline (ZOLOFT) 100 MG tablet 30 tablet 0     Sig: Take 1 tablet (100 mg) by mouth daily       SSRIs Protocol Failed - 1/22/2021  9:05 AM        Failed - PHQ-9 score less than 5 in past 6 months     Please review last PHQ-9 score.           Passed - Medication is active on med list        Passed - Patient is age 18 or older        Passed - No active pregnancy on record        Passed - No positive pregnancy test in last 12 months        Passed - Recent (6 mo) or future (30 days) visit within the authorizing provider's specialty     Patient had office visit in the last 6 months or has a visit in the next 30 days with authorizing provider or within the authorizing provider's specialty.  See \"Patient Info\" tab in inbasket, or \"Choose Columns\" in Meds & Orders section of the refill encounter.               This refill/encounter is being handled by a team outside your facility.  If action needs to be taken, please route the encounter back to your team that would normally handle it. Thank you. Sybil Macario R.N.          "

## 2021-01-22 NOTE — TELEPHONE ENCOUNTER
Last Written Prescription Date:  12/22/20  Last Fill Quantity: 30,  # refills: 0   Last office visit: 6/29/20 with prescribing provider:  GORDO   Future Office Visit:   Next 5 appointments (look out 90 days)    Jan 26, 2021  5:20 PM  Office Visit with Sylwia Cutler MD  [facility] ([facility]) [address] [place] [id number] [phone]

## 2021-01-26 ENCOUNTER — OFFICE VISIT (OUTPATIENT)
Dept: FAMILY MEDICINE | Facility: CLINIC | Age: 51
End: 2021-01-26
Payer: COMMERCIAL

## 2021-01-26 VITALS
HEART RATE: 84 BPM | TEMPERATURE: 99 F | RESPIRATION RATE: 18 BRPM | HEIGHT: 67 IN | DIASTOLIC BLOOD PRESSURE: 76 MMHG | WEIGHT: 158.2 LBS | BODY MASS INDEX: 24.83 KG/M2 | SYSTOLIC BLOOD PRESSURE: 140 MMHG | OXYGEN SATURATION: 96 %

## 2021-01-26 DIAGNOSIS — G47.00 INSOMNIA, UNSPECIFIED TYPE: ICD-10-CM

## 2021-01-26 DIAGNOSIS — L40.9 PSORIASIS: Primary | ICD-10-CM

## 2021-01-26 DIAGNOSIS — F32.1 MODERATE MAJOR DEPRESSION (H): ICD-10-CM

## 2021-01-26 DIAGNOSIS — F41.9 ANXIETY: ICD-10-CM

## 2021-01-26 DIAGNOSIS — M35.00 SICCA SYNDROME (H): ICD-10-CM

## 2021-01-26 PROCEDURE — 99214 OFFICE O/P EST MOD 30 MIN: CPT | Performed by: FAMILY MEDICINE

## 2021-01-26 RX ORDER — LORAZEPAM 0.5 MG/1
0.5 TABLET ORAL 3 TIMES DAILY PRN
Qty: 20 TABLET | Refills: 0 | Status: SHIPPED | OUTPATIENT
Start: 2021-01-26

## 2021-01-26 RX ORDER — CLOBETASOL PROPIONATE 0.5 MG/G
CREAM TOPICAL 2 TIMES DAILY
Qty: 60 G | Refills: 1 | Status: SHIPPED | OUTPATIENT
Start: 2021-01-26 | End: 2023-05-12

## 2021-01-26 RX ORDER — TEMAZEPAM 15 MG/1
15 CAPSULE ORAL
Qty: 90 CAPSULE | Refills: 1 | Status: SHIPPED | OUTPATIENT
Start: 2021-01-26 | End: 2021-07-23

## 2021-01-26 RX ORDER — SERTRALINE HYDROCHLORIDE 100 MG/1
100 TABLET, FILM COATED ORAL DAILY
Qty: 90 TABLET | Refills: 3 | Status: SHIPPED | OUTPATIENT
Start: 2021-01-26 | End: 2022-01-28

## 2021-01-26 ASSESSMENT — ANXIETY QUESTIONNAIRES
2. NOT BEING ABLE TO STOP OR CONTROL WORRYING: NOT AT ALL
GAD7 TOTAL SCORE: 6
6. BECOMING EASILY ANNOYED OR IRRITABLE: SEVERAL DAYS
3. WORRYING TOO MUCH ABOUT DIFFERENT THINGS: SEVERAL DAYS
5. BEING SO RESTLESS THAT IT IS HARD TO SIT STILL: SEVERAL DAYS
7. FEELING AFRAID AS IF SOMETHING AWFUL MIGHT HAPPEN: SEVERAL DAYS
IF YOU CHECKED OFF ANY PROBLEMS ON THIS QUESTIONNAIRE, HOW DIFFICULT HAVE THESE PROBLEMS MADE IT FOR YOU TO DO YOUR WORK, TAKE CARE OF THINGS AT HOME, OR GET ALONG WITH OTHER PEOPLE: NOT DIFFICULT AT ALL
1. FEELING NERVOUS, ANXIOUS, OR ON EDGE: SEVERAL DAYS

## 2021-01-26 ASSESSMENT — MIFFLIN-ST. JEOR: SCORE: 1360.22

## 2021-01-26 ASSESSMENT — PAIN SCALES - GENERAL: PAINLEVEL: NO PAIN (0)

## 2021-01-26 ASSESSMENT — PATIENT HEALTH QUESTIONNAIRE - PHQ9
SUM OF ALL RESPONSES TO PHQ QUESTIONS 1-9: 3
5. POOR APPETITE OR OVEREATING: SEVERAL DAYS

## 2021-01-26 NOTE — PROGRESS NOTES
Assessment & Plan     Psoriasis  Has used topical steroids in the past successfully.  No evidence of psoriatic arthritis at this point and she does have a rheumatologist that she follows with.  So we will treat topically.  Discussed mechanism of action of the proposed medication, as well as potential effects, both good and bad.  Patient expressed understanding and agreed with treatment.   - clobetasol (TEMOVATE) 0.05 % external cream; Apply topically 2 times daily To hands or other sites of psoriasis.    Sicca syndrome (H)  Stable and following with her rheumatologist.    Moderate major depression (H)  Stable on current regimen.  Continue same plan and routine follow-up.   - sertraline (ZOLOFT) 100 MG tablet; Take 1 tablet (100 mg) by mouth daily    Anxiety  Stable on current regimen.  Continue same plan and routine follow-up.  Rare intermittent use of lorazepam.    - sertraline (ZOLOFT) 100 MG tablet; Take 1 tablet (100 mg) by mouth daily  - LORazepam (ATIVAN) 0.5 MG tablet; Take 1 tablet (0.5 mg) by mouth 3 times daily as needed for anxiety    Insomnia, unspecified type  Stable on current regimen.  Continue same plan and routine follow-up.   - temazepam (RESTORIL) 15 MG capsule; Take 1 capsule (15 mg) by mouth nightly as needed for sleep        See Patient Instructions    Return in about 1 month (around 2/26/2021) for As scheduled for physical.    Sylwia Cutler MD  [facility]    Subjective       HPI       Psoriasis flare up  -would like ointment   -was prescribed a ointment in the past    Past year has had a lump growing inside jaw. Could it be from the arthritis.    Here today primarily in follow-up of psoriasis.  This is a known issue that has come and gone and is fairly low level.  Primarily in the backs of her hands and is a little bit itchy.  Has responded to topical steroids in the past.  Has a rheumatologist she sees for her underlying issues.  Happy with her treatment for  "depression and anxiety and insomnia.  Was just wondering if psoriasis could affect a lump near her lower front teeth.    Review of Systems   Constitutional, HEENT, cardiovascular, pulmonary, gi and gu systems are negative, except as otherwise noted.      Objective    BP (!) 140/76 (BP Location: Right arm, Patient Position: Sitting, Cuff Size: Adult Regular)   Pulse 84   Temp 99  F (37.2  C) (Oral)   Resp 18   Ht 1.702 m (5' 7\")   Wt 71.8 kg (158 lb 3.2 oz)   LMP  (LMP Unknown)   SpO2 96%   BMI 24.78 kg/m    Body mass index is 24.78 kg/m .  Physical Exam   Alert, pleasant, upbeat, and in no apparent discomfort.   S1 and S2 normal, no murmurs, clicks, gallops or rubs. Regular rate and rhythm. Chest is clear; no wheezes or rales. No edema or JVD.   Skin shows psoriatic breakout on the backs of her hands   Fairly nonspecific fleshy small mass around her lower front teeth -she will be seeing dentist soon   Past labs reviewed with the patient.                 "

## 2021-01-27 ASSESSMENT — ANXIETY QUESTIONNAIRES: GAD7 TOTAL SCORE: 6

## 2021-03-08 ENCOUNTER — OFFICE VISIT (OUTPATIENT)
Dept: FAMILY MEDICINE | Facility: CLINIC | Age: 51
End: 2021-03-08
Payer: COMMERCIAL

## 2021-03-08 VITALS
BODY MASS INDEX: 24.71 KG/M2 | RESPIRATION RATE: 16 BRPM | WEIGHT: 157.4 LBS | DIASTOLIC BLOOD PRESSURE: 74 MMHG | HEART RATE: 80 BPM | HEIGHT: 67 IN | TEMPERATURE: 98.6 F | OXYGEN SATURATION: 98 % | SYSTOLIC BLOOD PRESSURE: 127 MMHG

## 2021-03-08 DIAGNOSIS — A42.2 LUMPY JAW: ICD-10-CM

## 2021-03-08 DIAGNOSIS — Z13.1 SCREENING FOR DIABETES MELLITUS: ICD-10-CM

## 2021-03-08 DIAGNOSIS — Z13.0 SCREENING, ANEMIA, DEFICIENCY, IRON: ICD-10-CM

## 2021-03-08 DIAGNOSIS — Z00.00 ROUTINE GENERAL MEDICAL EXAMINATION AT A HEALTH CARE FACILITY: Primary | ICD-10-CM

## 2021-03-08 DIAGNOSIS — Z13.220 LIPID SCREENING: ICD-10-CM

## 2021-03-08 DIAGNOSIS — Z12.31 ENCOUNTER FOR SCREENING MAMMOGRAM FOR BREAST CANCER: ICD-10-CM

## 2021-03-08 DIAGNOSIS — Z12.4 CERVICAL CANCER SCREENING: ICD-10-CM

## 2021-03-08 LAB
ERYTHROCYTE [DISTWIDTH] IN BLOOD BY AUTOMATED COUNT: 14.2 % (ref 10–15)
HCT VFR BLD AUTO: 39.1 % (ref 35–47)
HGB BLD-MCNC: 12 G/DL (ref 11.7–15.7)
MCH RBC QN AUTO: 26.4 PG (ref 26.5–33)
MCHC RBC AUTO-ENTMCNC: 30.7 G/DL (ref 31.5–36.5)
MCV RBC AUTO: 86 FL (ref 78–100)
PLATELET # BLD AUTO: 259 10E9/L (ref 150–450)
RBC # BLD AUTO: 4.54 10E12/L (ref 3.8–5.2)
WBC # BLD AUTO: 6.9 10E9/L (ref 4–11)

## 2021-03-08 PROCEDURE — G0145 SCR C/V CYTO,THINLAYER,RESCR: HCPCS | Performed by: FAMILY MEDICINE

## 2021-03-08 PROCEDURE — 85027 COMPLETE CBC AUTOMATED: CPT | Performed by: FAMILY MEDICINE

## 2021-03-08 PROCEDURE — 36415 COLL VENOUS BLD VENIPUNCTURE: CPT | Performed by: FAMILY MEDICINE

## 2021-03-08 PROCEDURE — 87624 HPV HI-RISK TYP POOLED RSLT: CPT | Performed by: FAMILY MEDICINE

## 2021-03-08 PROCEDURE — 80053 COMPREHEN METABOLIC PANEL: CPT | Performed by: FAMILY MEDICINE

## 2021-03-08 PROCEDURE — 99396 PREV VISIT EST AGE 40-64: CPT | Performed by: FAMILY MEDICINE

## 2021-03-08 PROCEDURE — 80061 LIPID PANEL: CPT | Performed by: FAMILY MEDICINE

## 2021-03-08 ASSESSMENT — PAIN SCALES - GENERAL: PAINLEVEL: NO PAIN (0)

## 2021-03-08 ASSESSMENT — MIFFLIN-ST. JEOR: SCORE: 1356.59

## 2021-03-08 NOTE — PROGRESS NOTES
SUBJECTIVE:   CC: Jacquie Goncalves is an 50 year old woman who presents for preventive health visit.       Patient has been advised of split billing requirements and indicates understanding: Yes  Healthy Habits:    Getting at least 3 servings of Calcium per day:  Yes    Bi-annual eye exam:  Yes    Dental care twice a year:  NO    Sleep apnea or symptoms of sleep apnea:  None    Diet:  Regular (no restrictions)    Frequency of exercise:  2-3 days/week    Duration of exercise:  15-30 minutes    Taking medications regularly:  Yes    Barriers to taking medications:  None    Medication side effects:  None    PHQ-2 Total Score:    Additional concerns today:  Yes (just a question about a referral/oral surgery)  growth in jaw lump.  No pain.  Enlarging - more prominent behind the lower front teeth but also on the side by the lip.  Has titanium screws in the jaw on both sides but away from this area.   walking - arthritis in knees bilaterally.              Today's PHQ-2 Score:   PHQ-2 ( 1999 Pf[employer]) 6/20/2018   Q1: Little interest or pleasure in doing things 1   Q2: Feeling down, depressed or hopeless 1   PHQ-2 Score 2       Abuse: Current or Past (Physical, Sexual or Emotional) - No  Do you feel safe in your environment? Yes    Have you ever done Advance Care Planning? (For example, a Health Directive, POLST, or a discussion with a medical provider or your loved ones about your wishes): No, advance care planning information given to patient to review.  Patient plans to discuss their wishes with loved ones or provider.      Social History     Tobacco Use     Smoking status: Never Smoker     Smokeless tobacco: Never Used   Substance Use Topics     Alcohol use: Yes     Comment: Wine 1-2 glasses per week         Alcohol Use 9/6/2016   Prescreen: >3 drinks/day or >7 drinks/week? The patient does not drink >3 drinks per day nor >7 drinks per week.       Any new diagnosis of family breast, ovarian, or bowel cancer? No  "    Reviewed orders with patient.  Reviewed health maintenance and updated orders accordingly - Yes  BP Readings from Last 3 Encounters:   03/08/21 127/74   01/26/21 (!) 140/76   11/22/19 117/72    Wt Readings from Last 3 Encounters:   03/08/21 71.4 kg (157 lb 6.4 oz)   01/26/21 71.8 kg (158 lb 3.2 oz)   11/22/19 73.9 kg (163 lb)                    Breast CA Risk Screening:        Mammogram Screening: Recommended annual mammography  Pertinent mammograms are reviewed under the imaging tab.    History of abnormal Pap smear: NO - age 30-65 PAP every 5 years with negative HPV co-testing recommended  PAP / HPV Latest Ref Rng & Units 9/6/2016 7/2/2012   PAP - NIL NIL   HPV 16 DNA NEG Negative -   HPV 18 DNA NEG Negative -   OTHER HR HPV NEG Negative -     Reviewed and updated as needed this visit by clinical staff  Tobacco  Allergies  Meds   Med Hx  Surg Hx  Fam Hx  Soc Hx        Reviewed and updated as needed this visit by Provider  Tobacco  Allergies  Meds   Med Hx  Surg Hx  Fam Hx  Soc Hx       Past Medical History:   Diagnosis Date     ADD (attention deficit disorder)      Anxiety      Basal cell carcinoma of skin pre-2009    neck treated with aldara     Basal cell carcinoma of skin      Basal cell carcinoma, trunk 07/2012    chest     GERD (gastroesophageal reflux disease)      Hemorrhoids      Moderate major depression (H) 12/22/2010     Osteoarthritis     knees, sees rhem     Psoriasis      Sjogren's syndrome (H) 2011    Dr. German - rheumatologist      Past Surgical History:   Procedure Laterality Date     SURGICAL HISTORY OF -       jaw surgery         Review of Systems  10 point ROS of systems including Constitutional, Eyes, Respiratory, Cardiovascular, Gastroenterology, Genitourinary, Integumentary, Muscularskeletal, Psychiatric were all negative except for pertinent positives noted in my HPI.       OBJECTIVE:   /74   Pulse 80   Temp 98.6  F (37  C) (Oral)   Resp 16   Ht 1.702 m (5' 7\")  "  Wt 71.4 kg (157 lb 6.4 oz)   LMP  (LMP Unknown)   SpO2 98%   BMI 24.65 kg/m    Physical Exam  GENERAL APPEARANCE: healthy, alert and no distress  EYES: Eyes grossly normal to inspection, PERRL and conjunctivae and sclerae normal  HENT: ear canals and TM's normal, nose and mouth without ulcers or lesions, oropharynx clear, oral mucous membranes moist and slightly prominent area at based of the teeth lingual surface.  Receding gums on the buccal surface.  NECK: no adenopathy, no asymmetry, masses, or scars and thyroid normal to palpation  RESP: lungs clear to auscultation - no rales, rhonchi or wheezes  BREAST: normal without masses, tenderness or nipple discharge and no palpable axillary masses or adenopathy  CV: regular rate and rhythm, normal S1 S2, no S3 or S4, no murmur, click or rub, no peripheral edema and peripheral pulses strong  ABDOMEN: soft, nontender, no hepatosplenomegaly, no masses and bowel sounds normal   (female): normal female external genitalia, normal urethral meatus, vaginal mucosal atrophy noted, normal cervix, adnexae, and uterus without masses or abnormal discharge  MS: no musculoskeletal defects are noted and gait is age appropriate without ataxia  SKIN: no suspicious lesions or rashes  NEURO: Normal strength and tone, sensory exam grossly normal, mentation intact and speech normal  PSYCH: mentation appears normal and affect normal/bright    Diagnostic Test Results:  Labs reviewed in Epic  Results for orders placed or performed in visit on 03/08/21   Lipid panel reflex to direct LDL Fasting     Status: Abnormal   Result Value Ref Range    Cholesterol 205 (H) <200 mg/dL    Triglycerides 78 <150 mg/dL    HDL Cholesterol 84 >49 mg/dL    LDL Cholesterol Calculated 105 (H) <100 mg/dL    Non HDL Cholesterol 121 <130 mg/dL   Comprehensive metabolic panel     Status: None   Result Value Ref Range    Sodium 139 133 - 144 mmol/L    Potassium 3.9 3.4 - 5.3 mmol/L    Chloride 107 94 - 109 mmol/L     Carbon Dioxide 29 20 - 32 mmol/L    Anion Gap 3 3 - 14 mmol/L    Glucose 81 70 - 99 mg/dL    Urea Nitrogen 13 7 - 30 mg/dL    Creatinine 0.62 0.52 - 1.04 mg/dL    GFR Estimate >90 >60 mL/min/[1.73_m2]    GFR Estimate If Black >90 >60 mL/min/[1.73_m2]    Calcium 9.6 8.5 - 10.1 mg/dL    Bilirubin Total 0.4 0.2 - 1.3 mg/dL    Albumin 4.5 3.4 - 5.0 g/dL    Protein Total 7.4 6.8 - 8.8 g/dL    Alkaline Phosphatase 72 40 - 150 U/L    ALT 18 0 - 50 U/L    AST 24 0 - 45 U/L   CBC with platelets     Status: Abnormal   Result Value Ref Range    WBC 6.9 4.0 - 11.0 10e9/L    RBC Count 4.54 3.8 - 5.2 10e12/L    Hemoglobin 12.0 11.7 - 15.7 g/dL    Hematocrit 39.1 35.0 - 47.0 %    MCV 86 78 - 100 fl    MCH 26.4 (L) 26.5 - 33.0 pg    MCHC 30.7 (L) 31.5 - 36.5 g/dL    RDW 14.2 10.0 - 15.0 %    Platelet Count 259 150 - 450 10e9/L       ASSESSMENT/PLAN:   1. Routine general medical examination at a health care facility  Screening and preventative care discussed.  - Comprehensive metabolic panel  - CBC with platelets    2. Lumpy jaw  Discussed possible panoramic xray vs appointment with dentist for xray evaluation.  eval with dentist planned first.  Consider referral to ENT if persistent issue without cause determined.   - ORTHOPANTOGRAM    3. Lipid screening  The 10-year ASCVD risk score ([place] HARDIK Jr., et al., 2013) is: 0.9%    Values used to calculate the score:      Age: 50 years      Sex: Female      Is Non- : No      Diabetic: No      Tobacco smoker: No      Systolic Blood Pressure: 127 mmHg      Is BP treated: No      HDL Cholesterol: 84 mg/dL      Total Cholesterol: 205 mg/dL   Low risk heart disease noted.  - Lipid panel reflex to direct LDL Fasting    4. Screening for diabetes mellitus  Normal blood sugar.  - Comprehensive metabolic panel    5. Screening, anemia, deficiency, iron  Normal hemoglobin.  Mild microcytosis.   - CBC with platelets    6. Encounter for screening mammogram for breast  "cancer  Recommended mammo.   - *MA Screening Digital Bilateral; Future    Patient has been advised of split billing requirements and indicates understanding: Yes  COUNSELING:  Reviewed preventive health counseling, as reflected in patient instructions       Regular exercise       Healthy diet/nutrition       Vision screening       Osteoporosis prevention/bone health    Estimated body mass index is 24.65 kg/m  as calculated from the following:    Height as of this encounter: 1.702 m (5' 7\").    Weight as of this encounter: 71.4 kg (157 lb 6.4 oz).        She reports that she has never smoked. She has never used smokeless tobacco.      Counseling Resources:  ATP IV Guidelines  Pooled Cohorts Equation Calculator  Breast Cancer Risk Calculator  BRCA-Related Cancer Risk Assessment: FHS-7 Tool  FRAX Risk Assessment  ICSI Preventive Guidelines  Dietary Guidelines for Americans, 2010  USDA's MyPlate  ASA Prophylaxis  Lung CA Screening    Lita Kim MD  [facility]    Patient Instructions   Labs today.    Mammogram recommended.   You can call [phone] to schedule this at the [facility] in [place]      Follow up with dentist recommended.  Notify me if no cause for the lump on the jaw is noted.        "

## 2021-03-09 LAB
ALBUMIN SERPL-MCNC: 4.5 G/DL (ref 3.4–5)
ALP SERPL-CCNC: 72 U/L (ref 40–150)
ALT SERPL W P-5'-P-CCNC: 18 U/L (ref 0–50)
ANION GAP SERPL CALCULATED.3IONS-SCNC: 3 MMOL/L (ref 3–14)
AST SERPL W P-5'-P-CCNC: 24 U/L (ref 0–45)
BILIRUB SERPL-MCNC: 0.4 MG/DL (ref 0.2–1.3)
BUN SERPL-MCNC: 13 MG/DL (ref 7–30)
CALCIUM SERPL-MCNC: 9.6 MG/DL (ref 8.5–10.1)
CHLORIDE SERPL-SCNC: 107 MMOL/L (ref 94–109)
CHOLEST SERPL-MCNC: 205 MG/DL
CO2 SERPL-SCNC: 29 MMOL/L (ref 20–32)
CREAT SERPL-MCNC: 0.62 MG/DL (ref 0.52–1.04)
GFR SERPL CREATININE-BSD FRML MDRD: >90 ML/MIN/{1.73_M2}
GLUCOSE SERPL-MCNC: 81 MG/DL (ref 70–99)
HDLC SERPL-MCNC: 84 MG/DL
LDLC SERPL CALC-MCNC: 105 MG/DL
NONHDLC SERPL-MCNC: 121 MG/DL
POTASSIUM SERPL-SCNC: 3.9 MMOL/L (ref 3.4–5.3)
PROT SERPL-MCNC: 7.4 G/DL (ref 6.8–8.8)
SODIUM SERPL-SCNC: 139 MMOL/L (ref 133–144)
TRIGL SERPL-MCNC: 78 MG/DL

## 2021-03-09 NOTE — PATIENT INSTRUCTIONS
Labs today.    Mammogram recommended.   You can call [phone] to schedule this at the [facility] in [place]      Follow up with dentist recommended.  Notify me if no cause for the lump on the jaw is noted.

## 2021-03-12 LAB
COPATH REPORT: NORMAL
PAP: NORMAL

## 2021-03-15 NOTE — RESULT ENCOUNTER NOTE
Please print letter and attach results.  PSK      Attached you will find a copy of your recent laboratory report.  Your total cholesterol is slightly higher than previous but the LDL, bad cholesterol, is the same as 9 years ago and the HDL, good cholesterol is higher.  This combined with other risk factor assessment places you at low risk for heart disease.  Your blood sugar, liver, and kidney testing are all normal.  Your blood cell counts are normal.  The red blood cells are borderline small but of normal numbers.    Please call or MyChart message me if you have any questions.    Sincerely,         Lita Kim MD

## 2021-03-16 LAB
FINAL DIAGNOSIS: NORMAL
HPV HR 12 DNA CVX QL NAA+PROBE: NEGATIVE
HPV16 DNA SPEC QL NAA+PROBE: NEGATIVE
HPV18 DNA SPEC QL NAA+PROBE: NEGATIVE
SPECIMEN DESCRIPTION: NORMAL
SPECIMEN SOURCE CVX/VAG CYTO: NORMAL

## 2021-07-22 DIAGNOSIS — G47.00 INSOMNIA, UNSPECIFIED TYPE: ICD-10-CM

## 2021-07-23 RX ORDER — TEMAZEPAM 15 MG/1
15 CAPSULE ORAL
Qty: 30 CAPSULE | Refills: 0 | Status: SHIPPED | OUTPATIENT
Start: 2021-07-23 | End: 2021-08-23

## 2021-07-23 NOTE — TELEPHONE ENCOUNTER
Routing refill request to provider for review/approval because:  Drug not on the [facility] refill protocol   Carmen Lance BSN, RN

## 2021-08-23 DIAGNOSIS — G47.00 INSOMNIA, UNSPECIFIED TYPE: ICD-10-CM

## 2021-08-23 RX ORDER — TEMAZEPAM 15 MG/1
15 CAPSULE ORAL
Qty: 30 CAPSULE | Refills: 2 | Status: SHIPPED | OUTPATIENT
Start: 2021-08-23 | End: 2021-11-24

## 2021-09-10 DIAGNOSIS — K00.9 CONGENITAL ANOMALY OF TEETH: ICD-10-CM

## 2021-09-10 NOTE — TELEPHONE ENCOUNTER
I have not seen patient in over 1 year. Routing to provider who saw her for health maintenance exam this year

## 2021-10-26 ENCOUNTER — TELEPHONE (OUTPATIENT)
Dept: FAMILY MEDICINE | Facility: CLINIC | Age: 51
End: 2021-10-26

## 2021-10-26 NOTE — TELEPHONE ENCOUNTER
Patient Quality Outreach Summary      Summary:    Patient is due/failing the following:   Breast Cancer Screening - Mammogram    Type of outreach:    Sent letter.    Questions for provider review:    None                                                                                                                    MADDIE Oliveros.

## 2021-11-24 DIAGNOSIS — G47.00 INSOMNIA, UNSPECIFIED TYPE: ICD-10-CM

## 2021-11-24 RX ORDER — TEMAZEPAM 15 MG/1
15 CAPSULE ORAL
Qty: 30 CAPSULE | Refills: 2 | Status: SHIPPED | OUTPATIENT
Start: 2021-11-24 | End: 2022-02-16

## 2022-01-28 DIAGNOSIS — F32.1 MODERATE MAJOR DEPRESSION (H): ICD-10-CM

## 2022-01-28 DIAGNOSIS — F41.9 ANXIETY: ICD-10-CM

## 2022-01-28 RX ORDER — SERTRALINE HYDROCHLORIDE 100 MG/1
100 TABLET, FILM COATED ORAL DAILY
Qty: 90 TABLET | Refills: 0 | Status: SHIPPED | OUTPATIENT
Start: 2022-01-28 | End: 2022-04-13

## 2022-02-15 DIAGNOSIS — G47.00 INSOMNIA, UNSPECIFIED TYPE: ICD-10-CM

## 2022-02-16 RX ORDER — TEMAZEPAM 15 MG/1
15 CAPSULE ORAL
Qty: 30 CAPSULE | Refills: 0 | Status: SHIPPED | OUTPATIENT
Start: 2022-02-16 | End: 2022-03-18

## 2022-03-08 DIAGNOSIS — G47.00 INSOMNIA, UNSPECIFIED TYPE: ICD-10-CM

## 2022-03-09 RX ORDER — TEMAZEPAM 15 MG/1
CAPSULE ORAL
Qty: 30 CAPSULE | Refills: 0 | OUTPATIENT
Start: 2022-03-09

## 2022-03-09 NOTE — TELEPHONE ENCOUNTER
Refill denied to the pharmacy.   If patient schedules an appointment we can provide a parth refill.

## 2022-03-18 DIAGNOSIS — G47.00 INSOMNIA, UNSPECIFIED TYPE: ICD-10-CM

## 2022-03-18 RX ORDER — TEMAZEPAM 15 MG/1
CAPSULE ORAL
Qty: 30 CAPSULE | Refills: 0 | Status: SHIPPED | OUTPATIENT
Start: 2022-03-18 | End: 2022-04-13

## 2022-03-18 NOTE — TELEPHONE ENCOUNTER
Routing refill request to provider for review/approval because:  Drug not on the [facility] refill protocol.    Appointments in Next Year      Apr 13, 2022  8:20 AM  (Arrive by 8:00 AM)  Provider Visit with Lita Kim MD  [facility] ([facility] ) [phone]          Requested Prescriptions   Pending Prescriptions Disp Refills    temazepam (RESTORIL) 15 MG capsule [Pharmacy Med Name: Temazepam Oral Capsule 15 MG] 30 capsule 0     Sig: Take 1 capsule (15 mg) by mouth nightly as needed for sleep        There is no refill protocol information for this order         Diana Fisher RN, BSN  [facility]

## 2022-04-13 ENCOUNTER — VIRTUAL VISIT (OUTPATIENT)
Dept: FAMILY MEDICINE | Facility: CLINIC | Age: 52
End: 2022-04-13
Payer: COMMERCIAL

## 2022-04-13 DIAGNOSIS — M35.00 SICCA SYNDROME (H): ICD-10-CM

## 2022-04-13 DIAGNOSIS — G47.00 INSOMNIA, UNSPECIFIED TYPE: Primary | ICD-10-CM

## 2022-04-13 DIAGNOSIS — F32.1 MODERATE MAJOR DEPRESSION (H): ICD-10-CM

## 2022-04-13 DIAGNOSIS — F41.9 ANXIETY: ICD-10-CM

## 2022-04-13 PROCEDURE — 99214 OFFICE O/P EST MOD 30 MIN: CPT | Mod: 95 | Performed by: FAMILY MEDICINE

## 2022-04-13 RX ORDER — TEMAZEPAM 15 MG/1
15 CAPSULE ORAL
Qty: 30 CAPSULE | Refills: 5 | Status: SHIPPED | OUTPATIENT
Start: 2022-04-13 | End: 2022-10-11

## 2022-04-13 RX ORDER — SERTRALINE HYDROCHLORIDE 100 MG/1
100 TABLET, FILM COATED ORAL DAILY
Qty: 90 TABLET | Refills: 1 | Status: SHIPPED | OUTPATIENT
Start: 2022-04-13 | End: 2022-11-04

## 2022-04-13 ASSESSMENT — PATIENT HEALTH QUESTIONNAIRE - PHQ9: SUM OF ALL RESPONSES TO PHQ QUESTIONS 1-9: 4

## 2022-04-13 NOTE — PATIENT INSTRUCTIONS
Continue use of temazepam as previous 15 mg prior to bed.    Sertraline prescription was also updated to fill when due.    In review of your chart it appears you are due for a mammogram.  If you are interested in pursuing this, you can call [phone] to schedule this at the [facility] building in [place].

## 2022-04-13 NOTE — PROGRESS NOTES
Jacquie is a 51 year old who is being evaluated via a billable video visit.      How would you like to obtain your AVS? Mail a copy  If the video visit is dropped, the invitation should be resent by: Text to cell phone: [phone]  Will anyone else be joining your video visit? No      Video Start Time: 8:34 AM    Assessment & Plan     Insomnia, unspecified type  Chronic insomnia which has been present since her 's death 4 years ago.  Symptoms are well controlled with the current use of temazepam at bedtime.  Refills given.  Follow-up if no early morning awakening occurs to a greater degree.  - temazepam (RESTORIL) 15 MG capsule; Take 1 capsule (15 mg) by mouth nightly as needed for sleep    Moderate major depression (H)/Anxiety  Mood symptoms well controlled continue current medication.  Updated prescription sent  - sertraline (ZOLOFT) 100 MG tablet; Take 1 tablet (100 mg) by mouth daily    Sicca syndrome (H)  Symptoms are currently controlled.  Had tear duct plugging which resolved her dryness in the eyes.  No current concerns.      Prescription drug management         Patient Instructions   Continue use of temazepam as previous 15 mg prior to bed.    Sertraline prescription was also updated to fill when due.    In review of your chart it appears you are due for a mammogram.  If you are interested in pursuing this, you can call [phone] to schedule this at the [facility] in [place].      Return in about 6 months (around 10/13/2022) for Physical Exam, chronic health problems.    Lita Kim MD  [facility]   Jacquie is a 51 year old who presents for the following health issues     HPI     Insomnia  Onset/Duration: x four years  Description:   Frequency of insomnia:  nightly  Time to fall asleep (sleep latency): 1 hour  Middle of night awakening:  YES, occasionally  Early morning awakening:  YES  Progression of Symptoms:  same  Accompanying Signs &  Symptoms:  Daytime sleepiness/napping: no  Excessive snoring/apnea: no  Restless legs: no  Waking to urinate: no  Chronic pain:  no  Depression symptoms (if yes, do PHQ9): no  Anxiety symptoms (if yes, do SLOAN-7): no  History:  Prior Insomnia: notes always having trouble falling asleep  New stressful situation: pt's  passed away 4.5 years ago  Precipitating factors:   Caffeine intake: YES  OTC decongestants: no  Any new medications: no  Alleviating factors:  Self medicating (alcohol, etc.):  no  Stress-reduction (exercise, yoga, meditation etc): prayer as meditation  Therapies tried and outcome: lorazepam -  Effective, melatonin - occasional use- not helpful.    8 pm - asleep by 930, wears off by 2 am -sleeping lightly until 5 AM when she gets up for the day.  No carryover later in the day.      PHQ 6/29/2020 1/26/2021 4/13/2022   PHQ-9 Total Score 5 3 4   Q9: Thoughts of better off dead/self-harm past 2 weeks Not at all Not at all Not at all     SLOAN-7 SCORE 7/23/2019 6/29/2020 1/26/2021   Total Score - - -   Total Score 11 2 6             Review of Systems   Constitutional, HEENT, cardiovascular, pulmonary, gi and gu systems are negative, except as otherwise noted.      Objective    Vitals - Patient Reported  Pain Score: No Pain (0)        Physical Exam   GENERAL: Healthy, alert and no distress  EYES: Eyes grossly normal to inspection.  No discharge or erythema, or obvious scleral/conjunctival abnormalities.  RESP: No audible wheeze, cough, or visible cyanosis.  No visible retractions or increased work of breathing.    SKIN: Visible skin clear. No significant rash, abnormal pigmentation or lesions.  NEURO: Cranial nerves grossly intact.  Mentation and speech appropriate for age.  PSYCH: Mentation appears normal, affect normal/bright, judgement and insight intact, normal speech and appearance well-groomed.                Video-Visit Details    Type of service:  Video Visit    Video End Time:8:45  AM    Originating Location (pt. Location): Other work    Distant Location (provider location):  [facility]     Platform used for Video Visit: Merlyn

## 2022-10-10 DIAGNOSIS — G47.00 INSOMNIA, UNSPECIFIED TYPE: ICD-10-CM

## 2022-10-11 RX ORDER — TEMAZEPAM 15 MG/1
15 CAPSULE ORAL
Qty: 30 CAPSULE | Refills: 2 | Status: SHIPPED | OUTPATIENT
Start: 2022-10-11 | End: 2023-01-12

## 2022-11-04 DIAGNOSIS — F32.1 MODERATE MAJOR DEPRESSION (H): ICD-10-CM

## 2022-11-04 DIAGNOSIS — F41.9 ANXIETY: ICD-10-CM

## 2022-11-04 RX ORDER — SERTRALINE HYDROCHLORIDE 100 MG/1
100 TABLET, FILM COATED ORAL DAILY
Qty: 90 TABLET | Refills: 0 | Status: SHIPPED | OUTPATIENT
Start: 2022-11-04 | End: 2023-02-08

## 2022-12-10 DIAGNOSIS — K00.9 CONGENITAL ANOMALY OF TEETH: ICD-10-CM

## 2022-12-12 RX ORDER — SODIUM FLUORIDE AND POTASSIUM NITRATE 5.8; 57.5 MG/ML; MG/ML
GEL, DENTIFRICE DENTAL
Qty: 100 ML | Refills: 0 | Status: SHIPPED | OUTPATIENT
Start: 2022-12-12 | End: 2023-04-14

## 2023-01-11 DIAGNOSIS — G47.00 INSOMNIA, UNSPECIFIED TYPE: ICD-10-CM

## 2023-01-12 RX ORDER — TEMAZEPAM 15 MG/1
15 CAPSULE ORAL
Qty: 30 CAPSULE | Refills: 2 | Status: SHIPPED | OUTPATIENT
Start: 2023-01-12 | End: 2023-04-14

## 2023-02-08 DIAGNOSIS — F41.9 ANXIETY: ICD-10-CM

## 2023-02-08 DIAGNOSIS — F32.1 MODERATE MAJOR DEPRESSION (H): ICD-10-CM

## 2023-02-08 RX ORDER — SERTRALINE HYDROCHLORIDE 100 MG/1
100 TABLET, FILM COATED ORAL DAILY
Qty: 90 TABLET | Refills: 0 | Status: SHIPPED | OUTPATIENT
Start: 2023-02-08 | End: 2023-05-12

## 2023-04-13 DIAGNOSIS — G47.00 INSOMNIA, UNSPECIFIED TYPE: ICD-10-CM

## 2023-04-13 DIAGNOSIS — K00.9 CONGENITAL ANOMALY OF TEETH: ICD-10-CM

## 2023-04-14 RX ORDER — TEMAZEPAM 15 MG/1
15 CAPSULE ORAL
Qty: 30 CAPSULE | Refills: 0 | Status: SHIPPED | OUTPATIENT
Start: 2023-04-14 | End: 2023-05-12

## 2023-04-14 RX ORDER — SODIUM FLUORIDE AND POTASSIUM NITRATE 5.8; 57.5 MG/ML; MG/ML
GEL, DENTIFRICE DENTAL
Qty: 100 ML | Refills: 5 | Status: SHIPPED | OUTPATIENT
Start: 2023-04-14 | End: 2024-07-30

## 2023-05-12 ENCOUNTER — OFFICE VISIT (OUTPATIENT)
Dept: FAMILY MEDICINE | Facility: CLINIC | Age: 53
End: 2023-05-12
Payer: COMMERCIAL

## 2023-05-12 VITALS
BODY MASS INDEX: 23.76 KG/M2 | SYSTOLIC BLOOD PRESSURE: 108 MMHG | TEMPERATURE: 98.7 F | WEIGHT: 151.4 LBS | HEIGHT: 67 IN | RESPIRATION RATE: 17 BRPM | OXYGEN SATURATION: 98 % | HEART RATE: 77 BPM | DIASTOLIC BLOOD PRESSURE: 73 MMHG

## 2023-05-12 DIAGNOSIS — F32.1 MODERATE MAJOR DEPRESSION (H): ICD-10-CM

## 2023-05-12 DIAGNOSIS — L40.9 PSORIASIS: ICD-10-CM

## 2023-05-12 DIAGNOSIS — Z12.31 VISIT FOR SCREENING MAMMOGRAM: ICD-10-CM

## 2023-05-12 DIAGNOSIS — F33.0 MILD EPISODE OF RECURRENT MAJOR DEPRESSIVE DISORDER (H): Primary | ICD-10-CM

## 2023-05-12 DIAGNOSIS — G47.00 INSOMNIA, UNSPECIFIED TYPE: ICD-10-CM

## 2023-05-12 DIAGNOSIS — F41.9 ANXIETY: ICD-10-CM

## 2023-05-12 DIAGNOSIS — M35.00 SICCA SYNDROME (H): ICD-10-CM

## 2023-05-12 PROCEDURE — 99214 OFFICE O/P EST MOD 30 MIN: CPT | Performed by: FAMILY MEDICINE

## 2023-05-12 RX ORDER — CHLORAL HYDRATE 500 MG
2 CAPSULE ORAL DAILY
COMMUNITY

## 2023-05-12 RX ORDER — TEMAZEPAM 15 MG/1
15 CAPSULE ORAL
Qty: 90 CAPSULE | Refills: 1 | Status: SHIPPED | OUTPATIENT
Start: 2023-05-12 | End: 2023-11-05

## 2023-05-12 RX ORDER — SERTRALINE HYDROCHLORIDE 100 MG/1
100 TABLET, FILM COATED ORAL DAILY
Qty: 90 TABLET | Refills: 0 | OUTPATIENT
Start: 2023-05-12

## 2023-05-12 RX ORDER — SERTRALINE HYDROCHLORIDE 100 MG/1
100 TABLET, FILM COATED ORAL DAILY
Qty: 90 TABLET | Refills: 3 | Status: SHIPPED | OUTPATIENT
Start: 2023-05-12 | End: 2024-05-06

## 2023-05-12 RX ORDER — CLOBETASOL PROPIONATE 0.5 MG/G
CREAM TOPICAL 2 TIMES DAILY
Qty: 60 G | Refills: 1 | Status: SHIPPED | OUTPATIENT
Start: 2023-05-12

## 2023-05-12 ASSESSMENT — ANXIETY QUESTIONNAIRES
GAD7 TOTAL SCORE: 5
1. FEELING NERVOUS, ANXIOUS, OR ON EDGE: SEVERAL DAYS
2. NOT BEING ABLE TO STOP OR CONTROL WORRYING: NOT AT ALL
3. WORRYING TOO MUCH ABOUT DIFFERENT THINGS: MORE THAN HALF THE DAYS
6. BECOMING EASILY ANNOYED OR IRRITABLE: SEVERAL DAYS
5. BEING SO RESTLESS THAT IT IS HARD TO SIT STILL: NOT AT ALL
7. FEELING AFRAID AS IF SOMETHING AWFUL MIGHT HAPPEN: SEVERAL DAYS
8. IF YOU CHECKED OFF ANY PROBLEMS, HOW DIFFICULT HAVE THESE MADE IT FOR YOU TO DO YOUR WORK, TAKE CARE OF THINGS AT HOME, OR GET ALONG WITH OTHER PEOPLE?: NOT DIFFICULT AT ALL
7. FEELING AFRAID AS IF SOMETHING AWFUL MIGHT HAPPEN: SEVERAL DAYS
GAD7 TOTAL SCORE: 5
GAD7 TOTAL SCORE: 5
4. TROUBLE RELAXING: NOT AT ALL
IF YOU CHECKED OFF ANY PROBLEMS ON THIS QUESTIONNAIRE, HOW DIFFICULT HAVE THESE PROBLEMS MADE IT FOR YOU TO DO YOUR WORK, TAKE CARE OF THINGS AT HOME, OR GET ALONG WITH OTHER PEOPLE: NOT DIFFICULT AT ALL

## 2023-05-12 ASSESSMENT — PATIENT HEALTH QUESTIONNAIRE - PHQ9
SUM OF ALL RESPONSES TO PHQ QUESTIONS 1-9: 4
10. IF YOU CHECKED OFF ANY PROBLEMS, HOW DIFFICULT HAVE THESE PROBLEMS MADE IT FOR YOU TO DO YOUR WORK, TAKE CARE OF THINGS AT HOME, OR GET ALONG WITH OTHER PEOPLE: NOT DIFFICULT AT ALL
SUM OF ALL RESPONSES TO PHQ QUESTIONS 1-9: 4

## 2023-05-12 ASSESSMENT — PAIN SCALES - GENERAL: PAINLEVEL: NO PAIN (0)

## 2023-05-12 NOTE — PROGRESS NOTES
Assessment & Plan     Mild episode of recurrent major depressive disorder (H)  Stable on current regimen.  Continue same plan and routine follow-up.   - sertraline (ZOLOFT) 100 MG tablet; Take 1 tablet (100 mg) by mouth daily    Anxiety  Stable on current regimen.  Continue same plan and routine follow-up.   - sertraline (ZOLOFT) 100 MG tablet; Take 1 tablet (100 mg) by mouth daily    Insomnia, unspecified type  Stable on current regimen.  Continue same plan and routine follow-up.   - temazepam (RESTORIL) 15 MG capsule; Take 1 capsule (15 mg) by mouth nightly as needed for sleep    Sicca syndrome (H)  Has not had follow-up with a rheumatologist in a couple of years but things seem to be stable and she is continuing to follow    Psoriasis  Recent return of some rash primarily in the back of her left hand and we will set her up with clobetasol again.  - clobetasol (TEMOVATE) 0.05 % external cream; Apply topically 2 times daily To hands or other sites of psoriasis.    Visit for screening mammogram    - MA SCREENING DIGITAL BILAT - Future  (s+30); Future         See Patient Instructions    Sylwia Cutler MD  [facility]    Callie Dhillon is a 53 year old, presenting for the following health issues:  Recheck Medication (Sertraline and temazepam )         View : No data to display.              History of Present Illness       Mental Health Follow-up:  Patient presents to follow-up on Depression & Anxiety.Patient's depression since last visit has been:  No change  The patient is not having other symptoms associated with depression.  Patient's anxiety since last visit has been:  No change  The patient is not having other symptoms associated with anxiety.  Any significant life events: job concerns and grief or loss  Patient is not feeling anxious or having panic attacks.  Patient has no concerns about alcohol or drug use.    Reason for visit:  Depression and psorisas medication  "refills    She eats 2-3 servings of fruits and vegetables daily.She consumes 0 sweetened beverage(s) daily.She exercises with enough effort to increase her heart rate 9 or less minutes per day.  She exercises with enough effort to increase her heart rate 3 or less days per week.   She is taking medications regularly.    Today's PHQ-9         PHQ-9 Total Score: 4    PHQ-9 Q9 Thoughts of better off dead/self-harm past 2 weeks :   Not at all    How difficult have these problems made it for you to do your work, take care of things at home, or get along with other people: Not difficult at all  Today's SLOAN-7 Score: 5         Here today in follow-up of depression, anxiety, psoriasis.  Doing well.  Reports no interval health concerns.  Patient reports no side effects from medications, and desires no change in therapy.       Review of Systems   Constitutional, HEENT, cardiovascular, pulmonary, gi and gu systems are negative, except as otherwise noted.      Objective    /73 (BP Location: Right arm, Patient Position: Sitting, Cuff Size: Adult Regular)   Pulse 77   Temp 98.7  F (37.1  C) (Oral)   Resp 17   Ht 1.69 m (5' 6.54\")   Wt 68.7 kg (151 lb 6.4 oz)   LMP  (LMP Unknown)   SpO2 98%   BMI 24.05 kg/m    Body mass index is 24.05 kg/m .  Physical Exam   Alert, pleasant, upbeat, and in no apparent discomfort.  S1 and S2 normal, no murmurs, clicks, gallops or rubs. Regular rate and rhythm. Chest is clear; no wheezes or rales. No edema or JVD.  Couple of small plaques on the back of her left hand  Past labs reviewed with the patient.                     "

## 2023-06-06 ENCOUNTER — OFFICE VISIT (OUTPATIENT)
Dept: FAMILY MEDICINE | Facility: CLINIC | Age: 53
End: 2023-06-06
Payer: COMMERCIAL

## 2023-06-06 DIAGNOSIS — Z12.83 SKIN CANCER SCREENING: Primary | ICD-10-CM

## 2023-06-06 DIAGNOSIS — L81.4 SOLAR LENTIGINOSIS: ICD-10-CM

## 2023-06-06 DIAGNOSIS — L82.1 SEBORRHEIC KERATOSES: ICD-10-CM

## 2023-06-06 DIAGNOSIS — D18.01 CHERRY ANGIOMA: ICD-10-CM

## 2023-06-06 DIAGNOSIS — Z85.828 HISTORY OF BASAL CELL CARCINOMA: ICD-10-CM

## 2023-06-06 DIAGNOSIS — D22.9 MULTIPLE PIGMENTED NEVI: ICD-10-CM

## 2023-06-06 DIAGNOSIS — D49.2 NEOPLASM OF UNSPECIFIED BEHAVIOR OF BONE, SOFT TISSUE, AND SKIN: ICD-10-CM

## 2023-06-06 PROCEDURE — 99203 OFFICE O/P NEW LOW 30 MIN: CPT | Mod: 25 | Performed by: PHYSICIAN ASSISTANT

## 2023-06-06 PROCEDURE — 88305 TISSUE EXAM BY PATHOLOGIST: CPT | Performed by: DERMATOLOGY

## 2023-06-06 PROCEDURE — 11102 TANGNTL BX SKIN SINGLE LES: CPT | Performed by: PHYSICIAN ASSISTANT

## 2023-06-06 NOTE — PATIENT INSTRUCTIONS
Wound Care After a Biopsy    What is a skin biopsy?  A skin biopsy allows the doctor to examine a very small piece of tissue under the microscope to determine the diagnosis and the best treatment for the skin condition. A local anesthetic (numbing medicine) is injected with a very small needle into the skin area to be tested. A small piece of skin is taken from the area. Sometimes a suture (stitch) is used.     What are the risks of a skin biopsy?  I will experience scar, bleeding, swelling, pain, crusting and redness. I may experience incomplete removal or recurrence. Risks of this procedure are excessive bleeding, bruising, infection, nerve damage, numbness, thick (hypertrophic or keloidal) scar and non-diagnostic biopsy.    How should I care for my wound for the first 24 hours?  Keep the wound dry and covered for 24 hours  If it bleeds, hold direct pressure on the area for 15 minutes. If bleeding does not stop, call us or go to the emergency room  Avoid strenuous exercise the first 1-2 days or as your doctor instructs you    How should I care for the wound after 24 hours?  After 24 hours, remove the bandage  You may bathe or shower as normal  If you had a scalp biopsy, you can shampoo as usual and can use shower water to clean the biopsy site daily  Clean the wound once a day with gentle soap and water  Do not scrub, be gentle  Apply white petroleum/Vaseline after cleaning the wound with a cotton swab or a clean finger, and keep the site covered with a Bandaid /bandage. Bandages are not necessary with a scalp biopsy  If you are unable to cover the site with a Bandaid /bandage, re-apply ointment 2-3 times a day to keep the site moist. Moisture will help with healing  Avoid strenuous activity for first 1-2 days  Avoid lakes, rivers, pools, and oceans until the stitches are removed or the site is healed    How do I clean my wound?  Wash hands thoroughly with soap or use hand  before all wound care  Clean  the wound with gentle soap and water  Apply white petroleum/Vaseline  to wound after it is clean  Replace the Bandaid /bandage to keep the wound covered for the first few days or as instructed by your doctor  If you had a scalp biopsy, warm shower water to the area on a daily basis should suffice    What should I use to clean my wound?   Cotton-tipped applicators (Qtips )  White petroleum jelly (Vaseline ). Use a clean new container and use Q-tips to apply.  Bandaids  as needed  Gentle soap     How should I care for my wound long term?  Do not get your wound dirty  Keep up with wound care for one week or until the area is healed.    A small scab will form and fall off by itself when the area is completely healed. The area will be red and will become pink in color as it heals. Sun protection is very important for how your scar will turn out. Sunscreen with an SPF 30 or greater is recommended once the area is healed.  You should have some soreness but it should be mild and slowly go away over several days. Talk to your doctor about using tylenol for pain,    When should I call my doctor?  If you have increased:   Pain or swelling  Pus or drainage (clear or slightly yellow drainage is ok)  Temperature over 100F  Spreading redness or warmth around wound    When will I hear about my results?  The biopsy results can take 2 weeks to come back.  Your results will automatically release to [facility] before your provider has even reviewed them.  The clinic will call you with the results, send you a [facility] message, or have you schedule a follow-up clinic or phone time to discuss the results.  Contact our clinics if you do not hear from us in 2 weeks.    Who should I call with questions?  [facility]: [phone]  [facility]: [phone]  For urgent needs outside of business hours call the [facility] at [phone] and ask for the dermatology resident on  call Patient Education       Proper skin care from [facility]:    -Eliminate harsh soaps as they strip the natural oils from the skin, often resulting in dry itchy skin ( i.e. Dial, Zest, Swedish Spring)  -Use mild soaps such as Cetaphil or Dove Sensitive Skin in the shower. You do not need to use soap on arms, legs, and trunk every time you shower unless visibly soiled.   -Avoid hot or cold showers.  -After showering, lightly dry off and apply moisturizing within 2-3 minutes. This will help trap moisture in the skin.   -Aggressive use of a moisturizer at least 1-2 times a day to the entire body (including -Vanicream, Cetaphil, Aquaphor or Cerave) and moisturize hands after every washing.  -We recommend using moisturizers that come in a tub that needs to be scooped out, not a pump. This has more of an oil base. It will hold moisture in your skin much better than a water base moisturizer. The above recommended are non-pore clogging.      Wear a sunscreen with at least SPF 30 on your face, ears, neck and V of the chest daily. Wear sunscreen on other areas of the body if those areas are exposed to the sun throughout the day. Sunscreens can contain physical and/or chemical blockers. Physical blockers are less likely to clog pores, these include zinc oxide and titanium dioxide. Reapply every two hour and after swimming.     Sunscreen examples: https://www.ewg.org/sunscreen/    UV radiation  UVA radiation remains constant throughout the day and throughout the year. It is a longer wavelength than UVB and therefore penetrates deeper into the skin leading to immediate and delayed tanning, photoaging, and skin cancer. 70-80% of UVA and UVB radiation occurs between the hours of 10am-2pm.  UVB radiation  UVB radiation causes the most harmful effects and is more significant during the summer months. However, snow and ice can reflect UVB radiation leading to skin damage during the winter months as well. UVB radiation is  responsible for tanning, burning, inflammation, delayed erythema (pinkness), pigmentation (brown spots), and skin cancer.     I recommend self monthly full body exams and yearly full body exams with a dermatology provider. If you develop a new or changing lesion please follow up for examination. Most skin cancers are pink and scaly or pink and pearly. However, we do see blue/brown/black skin cancers.  Consider the ABCDEs of melanoma when giving yourself your monthly full body exam ( don't forget the groin, buttocks, feet, toes, etc). A-asymmetry, B-borders, C-color, D-diameter, E-elevation or evolving. If you see any of these changes please follow up in clinic. If you cannot see your back I recommend purchasing a hand held mirror to use with a larger wall mirror.       Checking for Skin Cancer  You can find cancer early by checking your skin each month. There are 3 kinds of skin cancer. They are melanoma, basal cell carcinoma, and squamous cell carcinoma. Doing monthly skin checks is the best way to find new marks or skin changes. Follow the instructions below for checking your skin.   The ABCDEs of checking moles for melanoma   Check your moles or growths for signs of melanoma using ABCDE:   Asymmetry: the sides of the mole or growth don t match  Border: the edges are ragged, notched, or blurred  Color: the color within the mole or growth varies  Diameter: the mole or growth is larger than 6 mm (size of a pencil eraser)  Evolving: the size, shape, or color of the mole or growth is changing (evolving is not shown in the images below)    Checking for other types of skin cancer  Basal cell carcinoma or squamous cell carcinoma have symptoms such as:     A spot or mole that looks different from all other marks on your skin  Changes in how an area feels, such as itching, tenderness, or pain  Changes in the skin's surface, such as oozing, bleeding, or scaliness  A sore that does not heal  New swelling or redness beyond  the border of a mole    Who s at risk?  Anyone can get skin cancer. But you are at greater risk if you have:   Fair skin, light-colored hair, or light-colored eyes  Many moles or abnormal moles on your skin  A history of sunburns from sunlight or tanning beds  A family history of skin cancer  A history of exposure to radiation or chemicals  A weakened immune system  If you have had skin cancer in the past, you are at risk for recurring skin cancer.   How to check your skin  Do your monthly skin checkups in front of a full-length mirror. Check all parts of your body, including your:   Head (ears, face, neck, and scalp)  Torso (front, back, and sides)  Arms (tops, undersides, upper, and lower armpits)  Hands (palms, backs, and fingers, including under the nails)  Buttocks and genitals  Legs (front, back, and sides)  Feet (tops, soles, toes, including under the nails, and between toes)  If you have a lot of moles, take digital photos of them each month. Make sure to take photos both up close and from a distance. These can help you see if any moles change over time.   Most skin changes are not cancer. But if you see any changes in your skin, call your doctor right away. Only he or she can diagnose a problem. If you have skin cancer, seeing your doctor can be the first step toward getting the treatment that could save your life.   [employer] last reviewed this educational content on 4/1/2019 2000-2020 [employer]. [address] [place] [address], [place] [address]. All rights reserved. This information is not intended as a substitute for professional medical care. Always follow your healthcare professional's instructions.       When should I call my doctor?  If you are worsening or not improving, please, contact us or seek urgent care as noted below.     Who should I call with questions (adults)?  [facility] (adult and pediatric): [phone]  [facility]  [place] (adult): [phone]  [facility] ([place], [place], [place] and [place]) [phone]  For urgent needs outside of business hours call the [facility] at [phone] and ask for the dermatology resident on call to be paged  If this is a medical emergency and you are unable to reach an ER, Call 911      If you need a prescription refill, please contact your pharmacy. Refills are approved or denied by our Physicians during normal business hours, Monday through Fridays  Per office policy, refills will not be granted if you have not been seen within the past year (or sooner depending on your child's condition)

## 2023-06-06 NOTE — PROGRESS NOTES
[facility] Dermatology Note  Encounter Date: Jun 6, 2023  Office Visit     Dermatology Problem List:  NUB, left deltoid, rule out BCC. Shave bx today  Psoriasis-  clobetasol 0.05% ointment  Sjogrens syndrome   HX of bcc anterior neck,   BCC anterior chest s/p ED&C 10/15/2012  ____________________________________________    Assessment & Plan:     #Neoplasm of unspecified behavior left deltoid, rule out BCC   we will perform shave biopsy today to determine management.     #History of nonmelanotic skin cancer, anterior lower neck and upper chest  No signs of recurrence  Recommend annual screenings    #Plaque psoriasis, followed by primary care and rheumatologist, stable   Continue clobetasol 0.05% ointment twice daily as needed    #Skin cancer screening with multiple benign nevi and solar lentigines  - ABCDEs: Counseled ABCDEs of melanoma: Asymmetry, Border (irregularity), Color (not uniform, changes in color), Diameter (greater than 6 mm which is about the size of a pencil eraser), and Evolving (any changes in preexisting moles).  - Sun protection: Counseled SPF30+ sunscreen, UPF clothing, sun avoidance, tanning bed avoidance.     # Seborrheic keratoses and cherry angiomas  Discussed benign nature.        Procedures Performed:   - Shave biopsy procedure note, location(s): Left deltoid. After discussion of benefits and risks including but not limited to bleeding, infection, scar, incomplete removal, recurrence, and non-diagnostic biopsy, written consent and photographs were obtained. The area was cleaned with isopropyl alcohol. 0.5mL of 1% lidocaine with epinephrine was injected to obtain adequate anesthesia of lesion(s). Shave biopsy at site(s) performed. Hemostasis was achieved with aluminium chloride. Petrolatum ointment and a sterile dressing were applied. The patient tolerated the procedure and no complications were noted. The patient was provided with verbal and written post care instructions.        Follow-up: 1 year(s) in-person, or earlier for new or changing lesions    Staff:     All risk, benefits and alternatives were discussed with patient.  Patient is in agreement and understands the assessment and plan.  All questions were answered.  Sun Screen Education was given.   Return to Clinic annually or sooner as needed.   Amanda Kirkpatrick PA-C   ____________________________________________    CC: Skin Check    HPI:  Ms. Jacquie Goncalves is a(n) 53 year old female who presents today as a new patient for a skin exam.  She reports she has a history of 2 basal cells treated in approximately 2009 and 2012.  Her mother had several basal cell carcinomas.  She has a spot on her left deltoid that she has had for 3 to 4 years.  She is shown a few different providers to recommend having this biopsied.  She has a history of psoriasis which is fairly well controlled with use of clobetasol ointment.    She is followed with rheumatology for Sjogren's syndrome and they are monitoring her arthralgias.    She wears a tinted sunscreen daily on her face and when she is outdoors she uses sunscreen on her body.    She is feeling well without any other skin concerns.      Patient is otherwise feeling well, without additional skin concerns.     Labs Reviewed:  N/A    Physical Exam:  Vitals: LMP  (LMP Unknown)   SKIN: Full skin, which includes the head/face, both arms, chest, back, abdomen,both legs, genitalia and/or groin buttocks, digits and/or nails, was examined.  -There is a 9 mm pink pearly papule on the left deltoid  - There are dome shaped bright red papules on the trunk.   Multiple regular brown pigmented macules and papules are identified on the trunk and extremities.   There is no erythema, telangectasias, nodularity, or pigmentation on the anterior lower neck and upper chest..  Scattered brown macules on sun exposed areas.  There are waxy stuck on tan to brown papules on the trunk and extremities..   -There are a  few thin erythematous scaly plaques on the left dorsal hand  - No other lesions of concern on areas examined.             Medications:  Current Outpatient Medications   Medication     B Complex Vitamins (B COMPLEX 1 PO)     cetirizine (ZYRTEC) 10 MG tablet     Cholecalciferol (VITAMIN D) 2000 UNIT CAPS     clobetasol (TEMOVATE) 0.05 % external cream     fish oil-omega-3 fatty acids 1000 MG capsule     LORazepam (ATIVAN) 0.5 MG tablet     PREVIDENT 5000 SENSITIVE 1.1-5 % GEL     Probiotic Product (PROBIOTIC FORMULA PO)     sertraline (ZOLOFT) 100 MG tablet     temazepam (RESTORIL) 15 MG capsule     No current facility-administered medications for this visit.      Past Medical History:   Patient Active Problem List   Diagnosis     Psoriasis     ADD (attention deficit disorder)     GERD (gastroesophageal reflux disease)     Anxiety     Osteoarthritis     Basal cell carcinoma of skin     CARDIOVASCULAR SCREENING; LDL GOAL LESS THAN 160     Moderate major depression (H)     Vitamin D deficiency     Xerostomia     Chronic rhinitis     Iron deficiency     History of basal cell carcinoma     Pain in joint     Paresthesia     Sicca syndrome (H)     Past Medical History:   Diagnosis Date     ADD (attention deficit disorder)      Anxiety      Basal cell carcinoma of skin pre-2009    neck treated with aldara     Basal cell carcinoma of skin      Basal cell carcinoma, trunk 07/2012    chest     GERD (gastroesophageal reflux disease)      Hemorrhoids      Moderate major depression (H) 12/22/2010     Osteoarthritis     knees, sees rhem     Psoriasis      Sjogren's syndrome (H) 2011    Dr. German - rheumatologist       CC No referring provider defined for this encounter. on close of this encounter.

## 2023-06-06 NOTE — LETTER
6/6/2023         RE: Jacquie Goncalves  [address]  [place] [address]        Dear Colleague,    Thank you for referring your patient, Jacquie Goncalves, to the [facility] [place]. Please see a copy of my visit note below.    [facility] Dermatology Note  Encounter Date: Jun 6, 2023  Office Visit     Dermatology Problem List:  NUB, left deltoid, rule out BCC. Shave bx today  Psoriasis-  clobetasol 0.05% ointment  Sjogrens syndrome   HX of bcc anterior neck,   BCC anterior chest s/p ED&C 10/15/2012  ____________________________________________    Assessment & Plan:     #Neoplasm of unspecified behavior left deltoid, rule out BCC   we will perform shave biopsy today to determine management.     #History of nonmelanotic skin cancer, anterior lower neck and upper chest  No signs of recurrence  Recommend annual screenings    #Plaque psoriasis, followed by primary care and rheumatologist, stable   Continue clobetasol 0.05% ointment twice daily as needed    #Skin cancer screening with multiple benign nevi and solar lentigines  - ABCDEs: Counseled ABCDEs of melanoma: Asymmetry, Border (irregularity), Color (not uniform, changes in color), Diameter (greater than 6 mm which is about the size of a pencil eraser), and Evolving (any changes in preexisting moles).  - Sun protection: Counseled SPF30+ sunscreen, UPF clothing, sun avoidance, tanning bed avoidance.     # Seborrheic keratoses and cherry angiomas  Discussed benign nature.        Procedures Performed:   - Shave biopsy procedure note, location(s): Left deltoid. After discussion of benefits and risks including but not limited to bleeding, infection, scar, incomplete removal, recurrence, and non-diagnostic biopsy, written consent and photographs were obtained. The area was cleaned with isopropyl alcohol. 0.5mL of 1% lidocaine with epinephrine was injected to obtain adequate anesthesia of lesion(s). Shave biopsy at  site(s) performed. Hemostasis was achieved with aluminium chloride. Petrolatum ointment and a sterile dressing were applied. The patient tolerated the procedure and no complications were noted. The patient was provided with verbal and written post care instructions.       Follow-up: 1 year(s) in-person, or earlier for new or changing lesions    Staff:     All risk, benefits and alternatives were discussed with patient.  Patient is in agreement and understands the assessment and plan.  All questions were answered.  Sun Screen Education was given.   Return to Clinic annually or sooner as needed.   Amanda Kirkpatrick PA-C   ____________________________________________    CC: Skin Check    HPI:  Ms. Jacquie Goncalves is a(n) 53 year old female who presents today as a new patient for a skin exam.  She reports she has a history of 2 basal cells treated in approximately 2009 and 2012.  Her mother had several basal cell carcinomas.  She has a spot on her left deltoid that she has had for 3 to 4 years.  She is shown a few different providers to recommend having this biopsied.  She has a history of psoriasis which is fairly well controlled with use of clobetasol ointment.    She is followed with rheumatology for Sjogren's syndrome and they are monitoring her arthralgias.    She wears a tinted sunscreen daily on her face and when she is outdoors she uses sunscreen on her body.    She is feeling well without any other skin concerns.      Patient is otherwise feeling well, without additional skin concerns.     Labs Reviewed:  N/A    Physical Exam:  Vitals: LMP  (LMP Unknown)   SKIN: Full skin, which includes the head/face, both arms, chest, back, abdomen,both legs, genitalia and/or groin buttocks, digits and/or nails, was examined.  -There is a 9 mm pink pearly papule on the left deltoid  - There are dome shaped bright red papules on the trunk.   Multiple regular brown pigmented macules and papules are identified on the trunk and  extremities.   There is no erythema, telangectasias, nodularity, or pigmentation on the anterior lower neck and upper chest..  Scattered brown macules on sun exposed areas.  There are waxy stuck on tan to brown papules on the trunk and extremities..   -There are a few thin erythematous scaly plaques on the left dorsal hand  - No other lesions of concern on areas examined.             Medications:  Current Outpatient Medications   Medication     B Complex Vitamins (B COMPLEX 1 PO)     cetirizine (ZYRTEC) 10 MG tablet     Cholecalciferol (VITAMIN D) 2000 UNIT CAPS     clobetasol (TEMOVATE) 0.05 % external cream     fish oil-omega-3 fatty acids 1000 MG capsule     LORazepam (ATIVAN) 0.5 MG tablet     PREVIDENT 5000 SENSITIVE 1.1-5 % GEL     Probiotic Product (PROBIOTIC FORMULA PO)     sertraline (ZOLOFT) 100 MG tablet     temazepam (RESTORIL) 15 MG capsule     No current facility-administered medications for this visit.      Past Medical History:   Patient Active Problem List   Diagnosis     Psoriasis     ADD (attention deficit disorder)     GERD (gastroesophageal reflux disease)     Anxiety     Osteoarthritis     Basal cell carcinoma of skin     CARDIOVASCULAR SCREENING; LDL GOAL LESS THAN 160     Moderate major depression (H)     Vitamin D deficiency     Xerostomia     Chronic rhinitis     Iron deficiency     History of basal cell carcinoma     Pain in joint     Paresthesia     Sicca syndrome (H)     Past Medical History:   Diagnosis Date     ADD (attention deficit disorder)      Anxiety      Basal cell carcinoma of skin pre-2009    neck treated with aldara     Basal cell carcinoma of skin      Basal cell carcinoma, trunk 07/2012    chest     GERD (gastroesophageal reflux disease)      Hemorrhoids      Moderate major depression (H) 12/22/2010     Osteoarthritis     knees, sees rhem     Psoriasis      Sjogren's syndrome (H) 2011    Dr. German - rheumatologist       CC No referring provider defined for this  encounter. on close of this encounter.      Again, thank you for allowing me to participate in the care of your patient.        Sincerely,        Amanda Kirkpatrick PA-C

## 2023-06-08 LAB
PATH REPORT.COMMENTS IMP SPEC: ABNORMAL
PATH REPORT.COMMENTS IMP SPEC: ABNORMAL
PATH REPORT.COMMENTS IMP SPEC: YES
PATH REPORT.FINAL DX SPEC: ABNORMAL
PATH REPORT.GROSS SPEC: ABNORMAL
PATH REPORT.MICROSCOPIC SPEC OTHER STN: ABNORMAL
PATH REPORT.RELEVANT HX SPEC: ABNORMAL

## 2023-06-09 ENCOUNTER — TELEPHONE (OUTPATIENT)
Dept: FAMILY MEDICINE | Facility: CLINIC | Age: 53
End: 2023-06-09
Payer: COMMERCIAL

## 2023-06-09 DIAGNOSIS — C44.619 BASAL CELL CARCINOMA (BCC) OF SKIN OF LEFT UPPER EXTREMITY INCLUDING SHOULDER: Primary | ICD-10-CM

## 2023-06-09 NOTE — TELEPHONE ENCOUNTER
S/w pt and went over Amanda's message below.  Pt would prefer to go to [facility] and referral placed in chart.  Advised scheduling will call her to schedule for MG and pt states understanding.    Josee MCGOWAN RN  [facility]  [phone]

## 2023-06-09 NOTE — TELEPHONE ENCOUNTER
----- Message from Amanda Kirkpatrick PA-C sent at 6/9/2023  6:23 AM CDT -----   You can let Jacquie know:  The biopsied on the left deltoid is a basal cell carcinoma. This is a localized skin cancer and can continue growing if not further removed. It does not spread to other areas on the body. We will refer you to derm/surgery to have an excision of this site. Scheduling will contact you with your appointment.    (Please place derm/surg referral, thanks!)  Thanks,  Amanda Kirkpatrick PA-C

## 2023-06-13 ENCOUNTER — TELEPHONE (OUTPATIENT)
Dept: DERMATOLOGY | Facility: CLINIC | Age: 53
End: 2023-06-13
Payer: COMMERCIAL

## 2023-06-13 NOTE — TELEPHONE ENCOUNTER
Left patient a voicemail to schedule for excision of BCC left deltoid with Dr. Delarosa or Dr. Zendejas in . Provided my direct phone number.    Lupe Ochoa on 6/13/2023 at 12:10 PM

## 2023-06-20 NOTE — TELEPHONE ENCOUNTER
Called patient to schedule surgery with Dr. Delarosa    Date of Surgery: 08/22    Surgery type: excision    Consult scheduled: No    Has patient had mohs with us before? Not Applicable    Additional comments: leora Ochoa on 6/20/2023 at 2:05 PM

## 2023-08-09 DIAGNOSIS — F41.9 ANXIETY: ICD-10-CM

## 2023-08-11 RX ORDER — LORAZEPAM 0.5 MG/1
0.5 TABLET ORAL 3 TIMES DAILY PRN
Qty: 20 TABLET | Refills: 0 | OUTPATIENT
Start: 2023-08-11

## 2023-08-11 NOTE — TELEPHONE ENCOUNTER
We have not prescribed this in a few yrs - should have some type of appointment - e visit would be fine (but not signed up for [facility])

## 2023-08-11 NOTE — TELEPHONE ENCOUNTER
Called patient regaurding note below - patient states she will not be out of medication for a couple more months   Patient declines scheduling at this time

## 2023-08-18 ENCOUNTER — TELEPHONE (OUTPATIENT)
Dept: DERMATOLOGY | Facility: CLINIC | Age: 53
End: 2023-08-18
Payer: COMMERCIAL

## 2023-08-22 ENCOUNTER — OFFICE VISIT (OUTPATIENT)
Dept: DERMATOLOGY | Facility: CLINIC | Age: 53
End: 2023-08-22
Payer: COMMERCIAL

## 2023-08-22 VITALS — DIASTOLIC BLOOD PRESSURE: 79 MMHG | HEART RATE: 71 BPM | SYSTOLIC BLOOD PRESSURE: 118 MMHG | OXYGEN SATURATION: 99 %

## 2023-08-22 DIAGNOSIS — C44.619 BASAL CELL CARCINOMA (BCC) OF SKIN OF LEFT UPPER EXTREMITY INCLUDING SHOULDER: ICD-10-CM

## 2023-08-22 PROCEDURE — 88305 TISSUE EXAM BY PATHOLOGIST: CPT | Performed by: DERMATOLOGY

## 2023-08-22 PROCEDURE — 11602 EXC TR-EXT MAL+MARG 1.1-2 CM: CPT | Performed by: DERMATOLOGY

## 2023-08-22 PROCEDURE — 12032 INTMD RPR S/A/T/EXT 2.6-7.5: CPT | Performed by: DERMATOLOGY

## 2023-08-22 ASSESSMENT — PAIN SCALES - GENERAL: PAINLEVEL: NO PAIN (0)

## 2023-08-22 NOTE — PROGRESS NOTES
DERMATOLOGY EXCISION PROCEDURE NOTE      NAME OF PROCEDURE: Excision intermediate layered linear closure  Staff surgeon: Jeremy Delarosa MD  Resident: n/a  Scrub Nurse: Ivory Arndt RN    PRE-OPERATIVE DIAGNOSIS:  Basal Cell Carcinoma  POST-OPERATIVE DIAGNOSIS: Same   LOCATION: left deltoid  FINAL EXCISION SIZE (DEFECT SIZE): 1.5 cm  MARGIN: 0.4 cm  FINAL REPAIR LENGTH: 4 cm   ANESTHESIA: 9mL 1% lidocaine with 1:100,000 epinephrine    INDICATIONS: This patient presented with a 0.7cm Basal Cell Carcinoma. Excision was indicated. We discussed the principles of treatment and most likely complications including scarring, bleeding, infection, incomplete excision, wound dehiscence, pain, nerve damage, and recurrence. Informed consent was obtained and the patient underwent the procedure as follows:    PROCEDURE: The patient was taken to the operative suite. Time-out was performed.  The treatment area was anesthetized with 1% lidocaine with epinephrine. The area was prepped with Chlorhexidine and rinsed with sterile saline and draped with sterile towels. The lesion was delineated and excised down to subcutaneous fat in a elliptical manner. Hemostasis was obtained by electrocoagulation.     REPAIR: An intermediate layered linear closure was selected as the procedure which would maximally preserve both function and cosmesis.    After the excision of the tumor, the area was carefully undermined. Hemostasis was obtained with monopolar electrocoagulation.  Closure was oriented so that the wound was in the patient's natural skin tension lines. The subcutaneous and dermal layers were then closed with 4-0 monocryl sutures. The epidermis was then carefully approximated along the length of the wound using 4-0 monocryl running subcuticular sutures.     Estimated blood loss was less than 10 ml for all surgical sites. A sterile pressure dressing was applied and wound care instructions, with a written handout, were given. The patient was  discharged from the [facility] alert and ambulatory.    The patient elected for pathology results to automatically release and understands that the clinical staff will contact them as soon as possible to notify them of the results.    Follow-up in 6 months with gen derm    Anatomic Pathology Results: pending    Clinical Follow-Up: 6 months with gen derm.    Staff Involved:  Staff Only    Attending attestation:  I personally performed the entire procedure.  I have reviewed the note and edited it as necessary, and agree with its contents.    Jeremy Delarosa M.D.  Professor  Director of Dermatologic Surgery  Department of Dermatology  [facility]    Dermatology Surgery Clinic  [facility] [place] [phone]

## 2023-08-22 NOTE — LETTER
8/22/2023         RE: Jacquie Goncalves  [address]  [place] [address]        Dear Colleague,    Thank you for referring your patient, Jacquie Goncalves, to the [facility]. Please see a copy of my visit note below.    DERMATOLOGY EXCISION PROCEDURE NOTE      NAME OF PROCEDURE: Excision intermediate layered linear closure  Staff surgeon: Jeremy Delarosa MD  Resident: n/a  Scrub Nurse: Ivory Arndt RN    PRE-OPERATIVE DIAGNOSIS:  Basal Cell Carcinoma  POST-OPERATIVE DIAGNOSIS: Same   LOCATION: left deltoid  FINAL EXCISION SIZE (DEFECT SIZE): 1.5 cm  MARGIN: 0.4 cm  FINAL REPAIR LENGTH: 4 cm   ANESTHESIA: 9mL 1% lidocaine with 1:100,000 epinephrine    INDICATIONS: This patient presented with a 0.7cm Basal Cell Carcinoma. Excision was indicated. We discussed the principles of treatment and most likely complications including scarring, bleeding, infection, incomplete excision, wound dehiscence, pain, nerve damage, and recurrence. Informed consent was obtained and the patient underwent the procedure as follows:    PROCEDURE: The patient was taken to the operative suite. Time-out was performed.  The treatment area was anesthetized with 1% lidocaine with epinephrine. The area was prepped with Chlorhexidine and rinsed with sterile saline and draped with sterile towels. The lesion was delineated and excised down to subcutaneous fat in a elliptical manner. Hemostasis was obtained by electrocoagulation.     REPAIR: An intermediate layered linear closure was selected as the procedure which would maximally preserve both function and cosmesis.    After the excision of the tumor, the area was carefully undermined. Hemostasis was obtained with monopolar electrocoagulation.  Closure was oriented so that the wound was in the patient's natural skin tension lines. The subcutaneous and dermal layers were then closed with 4-0 monocryl sutures. The epidermis was then carefully approximated along the  length of the wound using 4-0 monocryl running subcuticular sutures.     Estimated blood loss was less than 10 ml for all surgical sites. A sterile pressure dressing was applied and wound care instructions, with a written handout, were given. The patient was discharged from the [facility] alert and ambulatory.    The patient elected for pathology results to automatically release and understands that the clinical staff will contact them as soon as possible to notify them of the results.    Follow-up in 6 months with gen derm    Anatomic Pathology Results: pending    Clinical Follow-Up: 6 months with gen derm.    Staff Involved:  Staff Only    Attending attestation:  I personally performed the entire procedure.  I have reviewed the note and edited it as necessary, and agree with its contents.    Jeremy Delarosa M.D.  Professor  Director of Dermatologic Surgery  Department of Dermatology  [facility]    Dermatology Surgery Clinic  [facility]  [address] [place] [address]        Again, thank you for allowing me to participate in the care of your patient.        Sincerely,        Jeremy Delarosa MD

## 2023-08-22 NOTE — NURSING NOTE
Jacquie Goncalves's chief complaint for this visit includes:  Chief Complaint   Patient presents with    Procedure     Excision BCC left deltoid     PCP: Sylwia Cutler    Referring Provider:  Amanda Kirkpatrick PA-C  [address] [place] [id number]    /79 (BP Location: Right arm, Patient Position: Sitting, Cuff Size: Adult Regular)   Pulse 71   LMP  (LMP Unknown)   SpO2 99%   No Pain (0)        Allergies   Allergen Reactions    Latex          Do you need any medication refills at today's visit? No    Izabella Parks, CMA

## 2023-08-22 NOTE — NURSING NOTE
The following medication was given:     MEDICATION:  Lidocaine with epinephrine 1% [id number]  ROUTE: SQ  SITE: see procedure note  DOSE: 9 mL  LOT #: [id number]  : [employer]  EXPIRATION DATE: 1/31/25  NDC#: 56042-279-79  Was there drug waste? No  Multi-dose vial: Yes    Mastisol, Steri-Strips, Tegaderm and pressure dressing applied to excision site on left deltoid.  Wound care instructions reviewed with patient and AVS provided.  Patient verbalized understanding.  Patient will follow up for suture removal: N/A.  No further questions or concerns at this time.    Ivory Arndt RN  August 22, 2023

## 2023-08-22 NOTE — PATIENT INSTRUCTIONS
Excision Wound Care Instructions with Steri-Strips    Possible complications of any surgical procedure are bleeding, infection, scarring, alteration in skin color and sensation, muscle weakness in the area, wound dehiscence or seperation, or recurrence of the lesion or disease. On occasion, after healing, a secondary procedure or revision may be recommended in order to obtain the best cosmetic or functional result.     After your surgery, a pressure bandage will be placed over the area that has sutures. This will help prevent bleeding. Please, follow these instructions as they will help you to prevent complications as your wound heals.    For the First 48 hours After Surgery:    Leave the pressure bandage on and keep it dry. If it should come loose, you may retape it, but do not take it off.    Relax and take it easy. Do not do any vigorous exercise, heavy lifting, or bending forward. This could cause the wound to bleed.    Post-operative pain is usually mild. You may alternate between 1000 mg of Tylenol (acetaminophen) and 400 mg of Ibuprofen every 4 hours.  Do not take more than 4,000 mg of acetaminophen and more than 3200 mg of Ibuprofen in a 24 hr period.  Avoid alcohol and vitamin E as these may increase your tendency to bleed.    You may put an ice pack around the bandaged area for 20 minutes every 2-3 hours. This may help reduce swelling, bruising, and pain. Make sure the ice pack is waterproof so that the pressure bandage does not get wet.     If your bandage is saturated with blood apply firm pressure over the bandage with a washcloth for 15 minutes. If bleeding continues after applying pressure for 15 minutes then go to the nearest emergency room.      48 Hours After Surgery:    Remove outer white bandage down to clear plastic film (Tegaderm).  You may notice dark brown, dried blood under the Tegaderm.  This is normal.    Leave the clear plastic film (Tegaderm) on for up to 2 weeks, as long as it is  intact.  If it falls off prior to 2 weeks follow daily wound care below.  If it stays intact for the full 2 weeks, then remove and treat as normal, healthy skin.      Daily Wound Care (if Tegaderm and Steri-Strips fall off prior to 2 weeks):    Wash wound with a mild soap and water.  Use caution when washing the wound, be gentle and do not let the forceful shower stream hit the wound directly. DO NOT WASH WITH HYDROGEN PEROXIDE AS THIS MIGHT CAUSE THE STITCHES TO DISSOLVE FASTER THAN WHAT WE WANT.    Pat dry.    Apply Vaseline (from a new container or tube) over the suture line with a Q-tip until it is completely healed. It is very important to keep the wound continuously moist, as wounds heal best in a moist environment.    Keep the site covered until it's healed.  You can cover it with a Telfa (non-stick) dressing and tape or a band-aid until healed with normal, healthy skin.      Call Us If:    You have pain that is not controlled with Tylenol/Ibuprofen.    You have signs or symptoms of an infection, such as: fever over 100 degrees F, redness, warmth, or foul-smelling or yellow/creamy drainage from the wound.      Who should I call with questions?  [facility]: [phone]  [facility]: [phone]  For urgent needs outside of business hours call the [facility] at [phone] and ask for the dermatology resident on call

## 2023-08-24 LAB
PATH REPORT.COMMENTS IMP SPEC: NORMAL
PATH REPORT.COMMENTS IMP SPEC: NORMAL
PATH REPORT.FINAL DX SPEC: NORMAL
PATH REPORT.GROSS SPEC: NORMAL
PATH REPORT.MICROSCOPIC SPEC OTHER STN: NORMAL
PATH REPORT.RELEVANT HX SPEC: NORMAL

## 2023-08-25 ENCOUNTER — TELEPHONE (OUTPATIENT)
Dept: DERMATOLOGY | Facility: CLINIC | Age: 53
End: 2023-08-25
Payer: COMMERCIAL

## 2023-08-25 NOTE — TELEPHONE ENCOUNTER
Writer called pt to discuss pathology results. Pt stated that the wound is healing well with no signs of infection. Pt denied having any questions or concerns at this time.     Aubrie Giron RN on 8/25/2023 at 10:53 AM      Final Diagnosis  A(1). Left deltoid:  - Scar from the prior surgical procedure, with no evidence of residual lesion - (see description)    Electronically signed by Anmol Castrejon MD on 8/24/2023 at 11:31 PM      Result Notes     Carmen Lancaster MD  8/25/2023 10:09 AM CDT Back to Top    Patient had BCC excised from L deltoid earlier this week. Pathology report reassuring and looks like BCC was successfully removed. Please call patient and let her know this. Thanks!     Carmen Lancaster MD

## 2023-11-05 DIAGNOSIS — G47.00 INSOMNIA, UNSPECIFIED TYPE: ICD-10-CM

## 2023-11-05 RX ORDER — TEMAZEPAM 15 MG/1
15 CAPSULE ORAL
Qty: 90 CAPSULE | Refills: 1 | Status: SHIPPED | OUTPATIENT
Start: 2023-11-05 | End: 2024-05-01

## 2024-05-01 DIAGNOSIS — G47.00 INSOMNIA, UNSPECIFIED TYPE: ICD-10-CM

## 2024-05-01 RX ORDER — TEMAZEPAM 15 MG/1
15 CAPSULE ORAL
Qty: 90 CAPSULE | Refills: 0 | Status: SHIPPED | OUTPATIENT
Start: 2024-05-01 | End: 2024-07-30

## 2024-05-06 DIAGNOSIS — F33.0 MILD EPISODE OF RECURRENT MAJOR DEPRESSIVE DISORDER (H): ICD-10-CM

## 2024-05-06 DIAGNOSIS — F41.9 ANXIETY: ICD-10-CM

## 2024-05-06 RX ORDER — SERTRALINE HYDROCHLORIDE 100 MG/1
100 TABLET, FILM COATED ORAL DAILY
Qty: 90 TABLET | Refills: 0 | Status: SHIPPED | OUTPATIENT
Start: 2024-05-06 | End: 2024-07-29

## 2024-05-19 ENCOUNTER — OFFICE VISIT (OUTPATIENT)
Dept: URGENT CARE | Facility: URGENT CARE | Age: 54
End: 2024-05-19
Payer: COMMERCIAL

## 2024-05-19 VITALS
OXYGEN SATURATION: 97 % | BODY MASS INDEX: 24.69 KG/M2 | DIASTOLIC BLOOD PRESSURE: 76 MMHG | TEMPERATURE: 98.5 F | RESPIRATION RATE: 20 BRPM | WEIGHT: 155.44 LBS | HEART RATE: 79 BPM | SYSTOLIC BLOOD PRESSURE: 113 MMHG

## 2024-05-19 DIAGNOSIS — S05.02XA LEFT CORNEAL ABRASION, INITIAL ENCOUNTER: Primary | ICD-10-CM

## 2024-05-19 DIAGNOSIS — M35.00 SICCA SYNDROME (H): ICD-10-CM

## 2024-05-19 PROCEDURE — 99213 OFFICE O/P EST LOW 20 MIN: CPT | Performed by: INTERNAL MEDICINE

## 2024-05-19 RX ORDER — ERYTHROMYCIN 5 MG/G
0.5 OINTMENT OPHTHALMIC
Qty: 3.5 G | Refills: 0 | Status: SHIPPED | OUTPATIENT
Start: 2024-05-19 | End: 2024-05-26

## 2024-05-19 NOTE — PATIENT INSTRUCTIONS
Topical antibiotics 6 x day  Cool compress  ibuprofen 400 mg 3 x day with food    Close windows.    follow-up with eye doctor in few days  as may be more difficulty to heal with sicca syndrome and large area involved

## 2024-05-19 NOTE — PROGRESS NOTES
ASSESSMENT AND PLAN:      ICD-10-CM    1. Left corneal abrasion, initial encounter  S05.02XA erythromycin (ROMYCIN) 5 MG/GM ophthalmic ointment      2. Sicca syndrome (H24)  M35.00         Patient Instructions       Topical antibiotics 6 x day  Cool compress  ibuprofen 400 mg 3 x day with food    Close windows.    follow-up with eye doctor in few days  as may be more difficulty to heal with sicca syndrome and large area involved      No follow-ups on file.        Madhavi Tariq MD  [facility] URGENT CARE    Subjective     Jacquie Goncalves is a 54 year old who presents for Patient presents with:  Urgent Care: Urgent care visit for possible eye infection.     Sinus Problem: The last couple of weeks she has been having nosebleeds from the left nostril. Now her left sinus is draining. She has a syndrome that dries out her nasal passages.   Eye Pain Left Eye: Pain in her left eye with any movement. She feels like something might be in it.     an established patient of [facility].      Eye Problem  Woke up this morning, felt like something in her eye.    Location: left eye     Current and Associated symptoms: possible foreign body  Eyes watery due to pain    Treatment measures tried include eye drops  Context: Possible foreign body  Hx dry eyes with tear duct plugs    Now nose draining on same side of eye issue        Review of Systems      Patient Active Problem List   Diagnosis    Psoriasis    ADD (attention deficit disorder)    GERD (gastroesophageal reflux disease)    Anxiety    Osteoarthritis    Basal cell carcinoma of skin    CARDIOVASCULAR SCREENING; LDL GOAL LESS THAN 160    Moderate major depression (H)    Vitamin D deficiency    Xerostomia    Chronic rhinitis    Iron deficiency    History of basal cell carcinoma    Pain in joint    Paresthesia    Sicca syndrome (H24)     Current Outpatient Medications   Medication Sig Dispense Refill    cetirizine (ZYRTEC) 10 MG tablet Take 10 mg by mouth daily       Cholecalciferol (D3 PO)       Cholecalciferol (VITAMIN D) 2000 UNIT CAPS Take 2,000 Units by mouth daily.      clobetasol (TEMOVATE) 0.05 % external cream Apply topically 2 times daily To hands or other sites of psoriasis. 60 g 1    erythromycin (ROMYCIN) 5 MG/GM ophthalmic ointment Place 0.5 inches Into the left eye 6 times daily for 7 days 3.5 g 0    Multiple Vitamins-Minerals (WOMENS MULTIVITAMIN PO)       PREVIDENT 5000 SENSITIVE 1.1-5 % GEL use to Brush teeth twice daily. 100 mL 5    sertraline (ZOLOFT) 100 MG tablet Take 1 tablet (100 mg) by mouth daily 90 tablet 0    temazepam (RESTORIL) 15 MG capsule Take 1 capsule (15 mg) by mouth nightly as needed for sleep 90 capsule 0    B Complex Vitamins (B COMPLEX 1 PO) Take  by mouth. (Patient not taking: Reported on 5/19/2024)      fish oil-omega-3 fatty acids 1000 MG capsule Take 2 g by mouth daily (Patient not taking: Reported on 5/19/2024)      LORazepam (ATIVAN) 0.5 MG tablet Take 1 tablet (0.5 mg) by mouth 3 times daily as needed for anxiety (Patient not taking: Reported on 5/19/2024) 20 tablet 0    Probiotic Product (PROBIOTIC FORMULA PO) Take 1 tablet by mouth daily. (Patient not taking: Reported on 5/19/2024)             Objective    /76 (BP Location: Left arm, Patient Position: Sitting, Cuff Size: Adult Regular)   Pulse 79   Temp 98.5  F (36.9  C) (Oral)   Resp 20   Wt 70.5 kg (155 lb 7 oz)   LMP  (LMP Unknown)   SpO2 97%   Breastfeeding No   BMI 24.69 kg/m    Physical Exam  Vitals reviewed.   Constitutional:       Appearance: Normal appearance.   Eyes:      Comments: left eye sclera red  Very tearful    No FB noted with eye lid eversions  Flourosceine uptake noted over 12, 10 oclock and 1/2 of area lighter uptake   Neurological:      Mental Status: She is alert.

## 2024-07-29 DIAGNOSIS — F41.9 ANXIETY: ICD-10-CM

## 2024-07-29 DIAGNOSIS — F33.0 MILD EPISODE OF RECURRENT MAJOR DEPRESSIVE DISORDER (H): ICD-10-CM

## 2024-07-29 RX ORDER — SERTRALINE HYDROCHLORIDE 100 MG/1
100 TABLET, FILM COATED ORAL DAILY
Qty: 30 TABLET | Refills: 0 | Status: SHIPPED | OUTPATIENT
Start: 2024-07-29 | End: 2024-09-04

## 2024-07-30 DIAGNOSIS — K00.9 CONGENITAL ANOMALY OF TEETH: ICD-10-CM

## 2024-07-30 DIAGNOSIS — G47.00 INSOMNIA, UNSPECIFIED TYPE: ICD-10-CM

## 2024-07-30 RX ORDER — SODIUM FLUORIDE AND POTASSIUM NITRATE 5.8; 57.5 MG/ML; MG/ML
GEL, DENTIFRICE DENTAL
Qty: 100 ML | Refills: 0 | Status: SHIPPED | OUTPATIENT
Start: 2024-07-30

## 2024-07-30 RX ORDER — TEMAZEPAM 15 MG/1
15 CAPSULE ORAL
Qty: 30 CAPSULE | Refills: 0 | Status: SHIPPED | OUTPATIENT
Start: 2024-07-30 | End: 2024-09-04

## 2024-07-30 NOTE — TELEPHONE ENCOUNTER
PDMP Review         Value Time User    State PDMP site checked  Yes 7/30/2024 10:41 AM Bronson Barba MD

## 2024-09-04 DIAGNOSIS — G47.00 INSOMNIA, UNSPECIFIED TYPE: ICD-10-CM

## 2024-09-04 DIAGNOSIS — F33.0 MILD EPISODE OF RECURRENT MAJOR DEPRESSIVE DISORDER (H): ICD-10-CM

## 2024-09-04 DIAGNOSIS — F41.9 ANXIETY: ICD-10-CM

## 2024-09-04 RX ORDER — SERTRALINE HYDROCHLORIDE 100 MG/1
100 TABLET, FILM COATED ORAL DAILY
Qty: 90 TABLET | Refills: 0 | Status: SHIPPED | OUTPATIENT
Start: 2024-09-04

## 2024-09-04 RX ORDER — TEMAZEPAM 15 MG/1
15 CAPSULE ORAL
Qty: 90 CAPSULE | Refills: 0 | Status: SHIPPED | OUTPATIENT
Start: 2024-09-04

## 2024-10-04 NOTE — TELEPHONE ENCOUNTER
"Requested Prescriptions   Pending Prescriptions Disp Refills    sertraline (ZOLOFT) 100 MG tablet [Pharmacy Med Name: Sertraline HCl Oral Tablet 100 MG] 90 tablet 0     Sig: Take 1 tablet (100 mg) by mouth daily        SSRIs Protocol Failed - 1/28/2022  2:00 AM        Failed - PHQ-9 score less than 5 in past 6 months     Please review last PHQ-9 score.           Failed - Recent (6 mo) or future (30 days) visit within the authorizing provider's specialty     Patient had office visit in the last 6 months or has a visit in the next 30 days with authorizing provider or within the authorizing provider's specialty.  See \"Patient Info\" tab in inbasket, or \"Choose Columns\" in Meds & Orders section of the refill encounter.            Passed - Medication is active on med list        Passed - Patient is age 18 or older        Passed - No active pregnancy on record        Passed - No positive pregnancy test in last 12 months              "
Refilled x 1.  Needs visit (OFV or video visit) before any further refill.   
Medications

## 2024-11-28 DIAGNOSIS — F41.9 ANXIETY: ICD-10-CM

## 2024-11-28 DIAGNOSIS — F33.0 MILD EPISODE OF RECURRENT MAJOR DEPRESSIVE DISORDER (H): ICD-10-CM

## 2024-12-01 DIAGNOSIS — G47.00 INSOMNIA, UNSPECIFIED TYPE: ICD-10-CM

## 2024-12-01 RX ORDER — SERTRALINE HYDROCHLORIDE 100 MG/1
100 TABLET, FILM COATED ORAL DAILY
Qty: 90 TABLET | Refills: 0 | Status: SHIPPED | OUTPATIENT
Start: 2024-12-01

## 2024-12-01 RX ORDER — TEMAZEPAM 15 MG/1
15 CAPSULE ORAL
Qty: 90 CAPSULE | Refills: 0 | Status: SHIPPED | OUTPATIENT
Start: 2024-12-01

## 2024-12-03 ENCOUNTER — TELEPHONE (OUTPATIENT)
Dept: FAMILY MEDICINE | Facility: CLINIC | Age: 54
End: 2024-12-03
Payer: COMMERCIAL

## 2024-12-03 NOTE — TELEPHONE ENCOUNTER
Plan does not cover temazepam (RESTORIL) 15 MG capsule .      Please go to [email or web address] to initiate Prior Auth or switch to alternative medication.    Insurance type and ID number: [id number]      Additional Information: [phone]    Nancy Cevallos

## 2024-12-05 NOTE — TELEPHONE ENCOUNTER
Prior Authorization Approval    Medication: TEMAZEPAM 15 MG PO CAPS  Authorization Effective Date: 11/5/2024  Authorization Expiration Date: 12/5/2025  Approved Dose/Quantity: as written  Reference #: [id number]   Insurance Company: [employer] - Phone [phone] Fax [phone]  Which Pharmacy is filling the prescription: [id number] - [place], [place] - [address]  Pharmacy Notified: y  Patient Notified: Instructed pharmacy to notify patient once order is ready.

## 2024-12-05 NOTE — TELEPHONE ENCOUNTER
PA Initiation    Medication: TEMAZEPAM 15 MG PO CAPS  Insurance Company: [employer] - Phone [phone] Fax [phone]  Pharmacy Filling the Rx: [id number] - [place] - [address]  Filling Pharmacy Phone: [phone]  Filling Pharmacy Fax: [phone]  Start Date: 12/5/2024

## 2024-12-23 ENCOUNTER — TELEPHONE (OUTPATIENT)
Dept: FAMILY MEDICINE | Facility: CLINIC | Age: 54
End: 2024-12-23
Payer: COMMERCIAL

## 2024-12-23 ENCOUNTER — PATIENT OUTREACH (OUTPATIENT)
Dept: CARE COORDINATION | Facility: CLINIC | Age: 54
End: 2024-12-23
Payer: COMMERCIAL

## 2024-12-23 NOTE — TELEPHONE ENCOUNTER
Patient updated on the below, no questions at this time, verbalized understanding.    Breanna Marcus RN

## 2024-12-23 NOTE — TELEPHONE ENCOUNTER
No, those numbers do not really mean anything.  They are just simply used in the calculation of some of the other numbers.  What we pay attention to is hemoglobin level when it comes to iron and vitamins and that is just fine.

## 2024-12-23 NOTE — TELEPHONE ENCOUNTER
Patient is calling to follow up on lab results. Patient noted that her MCH and her MCHC on the CBC were a little low. Patient is wondering if she needs to do any follow up for this.     Discussed that they weren't overly off. Notified will route to provider for additional review. Patient states that if there is something further she needs to do that we can call her, if no concerns, ok to send MyChart. Routing to provider, please review and advise. Luis Powell, RN, BSN

## 2025-01-04 ENCOUNTER — HEALTH MAINTENANCE LETTER (OUTPATIENT)
Age: 55
End: 2025-01-04

## 2025-05-02 DIAGNOSIS — K00.9 CONGENITAL ANOMALY OF TEETH: ICD-10-CM

## 2025-05-04 RX ORDER — SODIUM FLUORIDE AND POTASSIUM NITRATE 5.8; 57.5 MG/ML; MG/ML
GEL, DENTIFRICE DENTAL
Qty: 100 ML | Refills: 0 | Status: SHIPPED | OUTPATIENT
Start: 2025-05-04

## 2025-06-03 DIAGNOSIS — G47.00 INSOMNIA, UNSPECIFIED TYPE: ICD-10-CM

## 2025-06-04 RX ORDER — TEMAZEPAM 15 MG/1
15 CAPSULE ORAL
Qty: 90 CAPSULE | Refills: 1 | Status: SHIPPED | OUTPATIENT
Start: 2025-06-04